# Patient Record
Sex: MALE | Race: WHITE | Employment: FULL TIME | URBAN - METROPOLITAN AREA
[De-identification: names, ages, dates, MRNs, and addresses within clinical notes are randomized per-mention and may not be internally consistent; named-entity substitution may affect disease eponyms.]

---

## 2017-03-03 ENCOUNTER — TELEPHONE (OUTPATIENT)
Dept: FAMILY MEDICINE | Facility: CLINIC | Age: 52
End: 2017-03-03

## 2017-03-03 NOTE — TELEPHONE ENCOUNTER
Reason for call:  Patient reporting a symptom    Symptom or request: Lump on breast    Duration (how long have symptoms been present): 2 weeks    Have you been treated for this before? Yes    Additional comments: Pt concerned about this & wants to establish care with Dr Salas. Please work him in next week . Preferrably next Mon or Tuesday, anytime.      Phone Number patient can be reached at:  Cell number on file:    Telephone Information:   Mobile [phone]       Best Time:  any    Can we leave a detailed message on this number:  YES    Call taken on 3/3/2017 at 5:04 PM by Carmel Mobley

## 2017-03-15 ENCOUNTER — TELEPHONE (OUTPATIENT)
Dept: FAMILY MEDICINE | Facility: CLINIC | Age: 52
End: 2017-03-15

## 2017-03-15 NOTE — TELEPHONE ENCOUNTER
Reason for Call:  Same Day Appointment, Requested Provider:  Elder King MD    PCP: Elder King    Reason for visit: patient has lump on breast, seems to be growing    Duration of symptoms: 3 weeks    Have you been treated for this in the past? No    Additional comments: called last week for appointment, states he never received call back with appointment info    Can we leave a detailed message on this number? NO    Phone number patient can be reached at: Home number on file [phone] (home)    Best Time: anytime    Call taken on 3/15/2017 at 2:35 PM by Gerri Morales

## 2017-03-20 ENCOUNTER — OFFICE VISIT (OUTPATIENT)
Dept: FAMILY MEDICINE | Facility: CLINIC | Age: 52
End: 2017-03-20
Payer: COMMERCIAL

## 2017-03-20 VITALS
WEIGHT: 253 LBS | HEART RATE: 60 BPM | SYSTOLIC BLOOD PRESSURE: 118 MMHG | HEIGHT: 75 IN | TEMPERATURE: 97.5 F | DIASTOLIC BLOOD PRESSURE: 80 MMHG | BODY MASS INDEX: 31.46 KG/M2 | OXYGEN SATURATION: 98 %

## 2017-03-20 DIAGNOSIS — Z12.5 SCREENING FOR PROSTATE CANCER: ICD-10-CM

## 2017-03-20 DIAGNOSIS — Z11.59 NEED FOR HEPATITIS C SCREENING TEST: ICD-10-CM

## 2017-03-20 DIAGNOSIS — Z13.6 CARDIOVASCULAR SCREENING; LDL GOAL LESS THAN 130: ICD-10-CM

## 2017-03-20 DIAGNOSIS — N52.9 ERECTILE DYSFUNCTION, UNSPECIFIED ERECTILE DYSFUNCTION TYPE: ICD-10-CM

## 2017-03-20 DIAGNOSIS — Z00.00 ENCOUNTER FOR ROUTINE ADULT HEALTH EXAMINATION WITHOUT ABNORMAL FINDINGS: ICD-10-CM

## 2017-03-20 DIAGNOSIS — Z12.11 SCREEN FOR COLON CANCER: ICD-10-CM

## 2017-03-20 DIAGNOSIS — N63.10 MASS OF RIGHT BREAST: Primary | ICD-10-CM

## 2017-03-20 LAB
ALBUMIN SERPL-MCNC: 3.7 G/DL (ref 3.4–5)
ALBUMIN UR-MCNC: NEGATIVE MG/DL
ALP SERPL-CCNC: 72 U/L (ref 40–150)
ALT SERPL W P-5'-P-CCNC: 26 U/L (ref 0–70)
ANION GAP SERPL CALCULATED.3IONS-SCNC: 8 MMOL/L (ref 3–14)
APPEARANCE UR: CLEAR
AST SERPL W P-5'-P-CCNC: 18 U/L (ref 0–45)
BILIRUB SERPL-MCNC: 0.7 MG/DL (ref 0.2–1.3)
BILIRUB UR QL STRIP: NEGATIVE
BUN SERPL-MCNC: 20 MG/DL (ref 7–30)
CALCIUM SERPL-MCNC: 9 MG/DL (ref 8.5–10.1)
CHLORIDE SERPL-SCNC: 105 MMOL/L (ref 94–109)
CHOLEST SERPL-MCNC: 181 MG/DL
CO2 SERPL-SCNC: 28 MMOL/L (ref 20–32)
COLOR UR AUTO: YELLOW
CREAT SERPL-MCNC: 0.96 MG/DL (ref 0.66–1.25)
CREAT UR-MCNC: 170 MG/DL
ERYTHROCYTE [DISTWIDTH] IN BLOOD BY AUTOMATED COUNT: 13.4 % (ref 10–15)
GFR SERPL CREATININE-BSD FRML MDRD: 82 ML/MIN/1.7M2
GLUCOSE SERPL-MCNC: 92 MG/DL (ref 70–99)
GLUCOSE UR STRIP-MCNC: NEGATIVE MG/DL
HCT VFR BLD AUTO: 43.2 % (ref 40–53)
HCV AB SERPL QL IA: NORMAL
HDLC SERPL-MCNC: 50 MG/DL
HGB BLD-MCNC: 14.6 G/DL (ref 13.3–17.7)
HGB UR QL STRIP: NEGATIVE
KETONES UR STRIP-MCNC: NEGATIVE MG/DL
LDLC SERPL CALC-MCNC: 103 MG/DL
LEUKOCYTE ESTERASE UR QL STRIP: NEGATIVE
MCH RBC QN AUTO: 30.5 PG (ref 26.5–33)
MCHC RBC AUTO-ENTMCNC: 33.8 G/DL (ref 31.5–36.5)
MCV RBC AUTO: 90 FL (ref 78–100)
MICROALBUMIN UR-MCNC: 6 MG/L
MICROALBUMIN/CREAT UR: 3.55 MG/G CR (ref 0–17)
NITRATE UR QL: NEGATIVE
NONHDLC SERPL-MCNC: 131 MG/DL
PH UR STRIP: 6 PH (ref 5–7)
PLATELET # BLD AUTO: 202 10E9/L (ref 150–450)
POTASSIUM SERPL-SCNC: 4.4 MMOL/L (ref 3.4–5.3)
PROT SERPL-MCNC: 7.1 G/DL (ref 6.8–8.8)
PSA SERPL-ACNC: 0.28 UG/L (ref 0–4)
RBC # BLD AUTO: 4.78 10E12/L (ref 4.4–5.9)
SODIUM SERPL-SCNC: 141 MMOL/L (ref 133–144)
SP GR UR STRIP: 1.02 (ref 1–1.03)
TRIGL SERPL-MCNC: 141 MG/DL
TSH SERPL DL<=0.005 MIU/L-ACNC: 3.78 MU/L (ref 0.4–4)
URN SPEC COLLECT METH UR: NORMAL
UROBILINOGEN UR STRIP-ACNC: 0.2 EU/DL (ref 0.2–1)
WBC # BLD AUTO: 5 10E9/L (ref 4–11)

## 2017-03-20 PROCEDURE — 80061 LIPID PANEL: CPT | Performed by: FAMILY MEDICINE

## 2017-03-20 PROCEDURE — 81003 URINALYSIS AUTO W/O SCOPE: CPT | Performed by: FAMILY MEDICINE

## 2017-03-20 PROCEDURE — 36415 COLL VENOUS BLD VENIPUNCTURE: CPT | Performed by: FAMILY MEDICINE

## 2017-03-20 PROCEDURE — G0103 PSA SCREENING: HCPCS | Performed by: FAMILY MEDICINE

## 2017-03-20 PROCEDURE — 84443 ASSAY THYROID STIM HORMONE: CPT | Performed by: FAMILY MEDICINE

## 2017-03-20 PROCEDURE — 85027 COMPLETE CBC AUTOMATED: CPT | Performed by: FAMILY MEDICINE

## 2017-03-20 PROCEDURE — 82043 UR ALBUMIN QUANTITATIVE: CPT | Performed by: FAMILY MEDICINE

## 2017-03-20 PROCEDURE — 99214 OFFICE O/P EST MOD 30 MIN: CPT | Performed by: FAMILY MEDICINE

## 2017-03-20 PROCEDURE — 86803 HEPATITIS C AB TEST: CPT | Performed by: FAMILY MEDICINE

## 2017-03-20 PROCEDURE — 80053 COMPREHEN METABOLIC PANEL: CPT | Performed by: FAMILY MEDICINE

## 2017-03-20 RX ORDER — SILDENAFIL CITRATE 20 MG/1
20 TABLET ORAL DAILY PRN
Qty: 30 TABLET | Refills: 1 | Status: SHIPPED | OUTPATIENT
Start: 2017-03-20 | End: 2017-09-08

## 2017-03-20 NOTE — PATIENT INSTRUCTIONS
Follow up for physical examination and to follow up on labs when able.       [facility]                        To reach your care team during and after hours:   [phone]  To reach our pharmacy:        [phone]    Clinic Hours                        Our clinic hours are:    Monday   7:30 am to 7:00 pm                  Tuesday through Friday 7:30 am to 5:00 pm                             Saturday   8:00 am to 12:00 pm      Sunday   Closed      Pharmacy Hours                        Our pharmacy hours are:    Monday   8:30 am to 7:00 pm       Tuesday to Friday  8:30 am to 6:00 pm                       Saturday    9:00 am to 1:00 pm              Sunday    Closed              There is also information available at our web site:  [email or web address]    If your provider ordered any lab tests and you do not receive the results within 10 business days, please call the clinic.    If you need a medication refill please contact your pharmacy.  Please allow 2-3 business days for your refill to be completed.    Our clinic offers telephone visits and e visits.  Please ask one of your team members to explain more.      Use [employer] (secure email communication and access to your chart) to send your primary care provider a message or make an appointment. Ask someone on your Team how to sign up for [email or web address].  Immunizations                      Immunization History   Administered Date(s) Administered     TD (ADULT, 7+) 10/18/1988, 06/19/2000     TDAP (ADACEL AGES 11-64) 05/29/2008        Health Maintenance                         Health Maintenance Due   Topic Date Due     Hepatitis C Screening  02/02/1983

## 2017-03-20 NOTE — LETTER
[address] [id number] [phone]   March 21, 2017    Herman Joseph  [address]  [facility] [address]      Dear Herman,    Here is a summary of your recent test results:      Labs are overall quite good.     We advise:     Continue current cares.     For additional lab test information, [email or web address] is an excellent reference.     Your test results are enclosed.      Please contact me if you have any questions.               Thank you very much for trusting [facility]..     Healthy regards,        Elder King M.D.        Results for orders placed or performed in visit on 03/20/17   Hepatitis C Screen Reflex to HCV RNA Quant and Genotype   Result Value Ref Range    Hepatitis C Antibody  NR     Nonreactive   Assay performance characteristics have not been established for newborns,   infants, and children     Comprehensive metabolic panel   Result Value Ref Range    Sodium 141 133 - 144 mmol/L    Potassium 4.4 3.4 - 5.3 mmol/L    Chloride 105 94 - 109 mmol/L    Carbon Dioxide 28 20 - 32 mmol/L    Anion Gap 8 3 - 14 mmol/L    Glucose 92 70 - 99 mg/dL    Urea Nitrogen 20 7 - 30 mg/dL    Creatinine 0.96 0.66 - 1.25 mg/dL    GFR Estimate 82 >60 mL/min/1.7m2    GFR Estimate If Black >90   GFR Calc   >60 mL/min/1.7m2    Calcium 9.0 8.5 - 10.1 mg/dL    Bilirubin Total 0.7 0.2 - 1.3 mg/dL    Albumin 3.7 3.4 - 5.0 g/dL    Protein Total 7.1 6.8 - 8.8 g/dL    Alkaline Phosphatase 72 40 - 150 U/L    ALT 26 0 - 70 U/L    AST 18 0 - 45 U/L   Lipid panel reflex to direct LDL   Result Value Ref Range    Cholesterol 181 <200 mg/dL    Triglycerides 141 <150 mg/dL    HDL Cholesterol 50 >39 mg/dL    LDL Cholesterol Calculated 103 (H) <100 mg/dL    Non HDL Cholesterol 131 (H) <130 mg/dL   CBC with platelets   Result Value Ref Range    WBC 5.0 4.0 - 11.0 10e9/L    RBC Count 4.78 4.4 - 5.9 10e12/L     Hemoglobin 14.6 13.3 - 17.7 g/dL    Hematocrit 43.2 40.0 - 53.0 %    MCV 90 78 - 100 fl    MCH 30.5 26.5 - 33.0 pg    MCHC 33.8 31.5 - 36.5 g/dL    RDW 13.4 10.0 - 15.0 %    Platelet Count 202 150 - 450 10e9/L   TSH with free T4 reflex   Result Value Ref Range    TSH 3.78 0.40 - 4.00 mU/L   Prostate spec antigen screen   Result Value Ref Range    PSA 0.28 0 - 4 ug/L   Albumin Random Urine Quantitative   Result Value Ref Range    Creatinine Urine 170 mg/dL    Albumin Urine mg/L 6 mg/L    Albumin Urine mg/g Cr 3.55 0 - 17 mg/g Cr   *UA reflex to Microscopic and Culture ([facility] (except [place] and [place])   Result Value Ref Range    Color Urine Yellow     Appearance Urine Clear     Glucose Urine Negative NEG mg/dL    Bilirubin Urine Negative NEG    Ketones Urine Negative NEG mg/dL    Specific Gravity Urine 1.025 1.003 - 1.035    Blood Urine Negative NEG    pH Urine 6.0 5.0 - 7.0 pH    Protein Albumin Urine Negative NEG mg/dL    Urobilinogen Urine 0.2 0.2 - 1.0 EU/dL    Nitrite Urine Negative NEG    Leukocyte Esterase Urine Negative NEG    Source Midstream Urine

## 2017-03-20 NOTE — NURSING NOTE
"Chief Complaint   Patient presents with     Breast Problem       Initial /80  Pulse 60  Temp 97.5  F (36.4  C) (Oral)  Ht 6' 3\" (1.905 m)  Wt 253 lb (114.8 kg)  SpO2 98%  BMI 31.62 kg/m2 Estimated body mass index is 31.62 kg/(m^2) as calculated from the following:    Height as of this encounter: 6' 3\" (1.905 m).    Weight as of this encounter: 253 lb (114.8 kg)..  BP completed using cuff size: dirk Cardona MA  "

## 2017-03-20 NOTE — PROGRESS NOTES
SUBJECTIVE:                                                    Herman Joseph is a 52 year old male who presents to clinic today for the following health issues:    Concern - breast lump - right     Onset: 3 weeks    Description:   Right side    Intensity: mild    Progression of Symptoms:  worsening    Accompanying Signs & Symptoms:  Remembers getting hit by car door about 6 months ago  Pain occasionally       Previous history of similar problem:   none    Precipitating factors:   Worsened by: na    Alleviating factors:  Improved by: na       Therapies Tried and outcome: nothing    Herman reported that he has been experiencing pain in his right breast around the nipple area. He reported that he hit the area hard on a car door 6 months ago, but did not experience any symptoms immediately following the trauma.     Denied discharge. Denied bruising.     ED - requesting viagra refill    Sinusitis -- Herman stated that he wakes up with significant sinus issues in the morning. He has tried Flonase with little relief.     Urethral Stricture -- Pt stated he was told that he should follow up with Urology at some time.     Heartburn -- Well controlled    Depression -- stable, no concerns at this time.     Lipids --  Recent Labs   Lab Test  08/20/13   0815   CHOL  168   HDL  38*   LDL  103   TRIG  135   CHOLHDLRATIO  4.4     Desires fasting labs    Problem list and histories reviewed & adjusted, as indicated.  Additional history: as documented    Reviewed and updated as needed this visit by clinical staff  Tobacco  Allergies  Meds  Med Hx  Surg Hx  Fam Hx  Soc Hx      Reviewed and updated as needed this visit by Provider         BP Readings from Last 3 Encounters:   03/20/17 118/80   04/29/16 138/78   02/11/16 130/81       Wt Readings from Last 4 Encounters:   03/20/17 253 lb (114.8 kg)   04/29/16 247 lb 6.4 oz (112.2 kg)   02/11/16 234 lb 4.8 oz (106.3 kg)   01/12/16 233 lb 9 oz (105.9 kg)       Health  Maintenance    Health Maintenance Due   Topic Date Due     HEPATITIS C SCREENING  02/02/1983       Current Problem List    Patient Active Problem List   Diagnosis     Kyphoscoliosis and scoliosis     Contact dermatitis and other eczema, due to unspecified cause     Healthcare maintenance     Urethral stricture     CARDIOVASCULAR SCREENING; LDL GOAL LESS THAN 130     Esophageal reflux     Chronic sinusitis     ED (erectile dysfunction)       Past Medical History    Past Medical History   Diagnosis Date     CARDIOVASCULAR SCREENING; LDL GOAL LESS THAN 130      Cellulitis      Chronic sinusitis      ED (erectile dysfunction)      Esophageal reflux 6/20/2002     Kyphoscoliosis and scoliosis      Major depressive disorder, single episode, moderate (H) 9/11/2004     URETHRAL STRICTURE NEC 9/11/2004       Past Surgical History    Past Surgical History   Procedure Laterality Date     Hernia repair  2011     Bilateral inguinal     Fulgurate condyloma rectum  8/21/2013     Procedure: FULGURATE CONDYLOMA RECTUM;  EXCISION AND FULGURATION OF ANAL CONDYLOMA;  Surgeon: Naseem Partida MD;  Location: [facility]     Exam under anesthesia, fulgurate condyloma anus, combined  12/18/2013     Procedure: COMBINED EXAM UNDER ANESTHESIA, FULGURATE CONDYLOMA ANUS;  EXAM UNDER ANESTHESIA, EXCISION/FULGURATION ANAL CONDYLOMA;  Surgeon: Naseem Partida MD;  Location: [facility]     Fulgurate condyloma rectum N/A 2/11/2016     Procedure: FULGURATE CONDYLOMA RECTUM;  Surgeon: Naseem Partida MD;  Location: [facility]     Colonoscopy N/A 2/11/2016     normal, due 10 yrs       Current Medications    Current Outpatient Prescriptions   Medication Sig Dispense Refill     sildenafil (REVATIO/VIAGRA) 20 MG tablet Take 1 tablet (20 mg) by mouth daily as needed 30 tablet 1     hydrocortisone (WESTCORT) 0.2 % cream Apply sparingly to affected area three times daily as needed. 60 g 2     fluticasone (FLONASE) 50 MCG/ACT nasal spray Spray 1-2 sprays into both nostrils  daily 3 Package 1     [DISCONTINUED] sildenafil (REVATIO/VIAGRA) 20 MG tablet Take 1 tablet (20 mg) by mouth daily as needed 30 tablet 0       Allergies    No Known Allergies    Immunizations    Immunization History   Administered Date(s) Administered     TD (ADULT, 7+) 10/18/1988, 06/19/2000     TDAP (ADACEL AGES 11-64) 05/29/2008       Family History    Family History   Problem Relation Age of Onset     CANCER Father      throat     Depression Sister      Anxiety Disorder Sister      CANCER Maternal Grandmother      ovarian     HEART DISEASE Maternal Grandmother      DIABETES Maternal Uncle      HEART DISEASE Paternal Grandfather      ??     Depression Sister      Anxiety Disorder Sister      Alcohol/Drug Brother      Depression Sister      Anxiety Disorder Sister      Depression Sister      Anxiety Disorder Sister      Cancer - colorectal No family hx of      Prostate Cancer No family hx of        Social History    Social History     Social History     Marital status:      Spouse name: Elen/Trudi     Number of children: 6     Years of education: 12     Occupational History           [employer]     Social History Main Topics     Smoking status: Never Smoker     Smokeless tobacco: Never Used     Alcohol use 1.8 - 2.4 oz/week     3 - 4 Standard drinks or equivalent per week      Comment: 6 per month avg     Drug use: No     Sexual activity: Yes     Partners: Female     Other Topics Concern     Exercise Yes     Seat Belt Yes     Self-Exams Yes     Parent/Sibling W/ Cabg, Mi Or Angioplasty Before 65f 55m? No     Social History Narrative       All above reviewed and updated, all stable unless otherwise noted    Recent labs reviewed    ROS:  Constitutional, HEENT, cardiovascular, pulmonary, GI, , musculoskeletal, neuro, skin, endocrine and psych systems are negative, except as in HPI or otherwise noted     This document serves as a record of the services and decisions personally performed and made  "by Elder King MD [facility]. It was created on their behalf by Claude David, a trained medical scribe. The creation of this document is based the provider's statements to the medical scribe.  Claude David March 20, 2017 8:22 AM      OBJECTIVE:                                                    /80  Pulse 60  Temp 97.5  F (36.4  C) (Oral)  Ht 6' 3\" (1.905 m)  Wt 253 lb (114.8 kg)  SpO2 98%  BMI 31.62 kg/m2  Body mass index is 31.62 kg/(m^2).  GENERAL: healthy, alert and no distress, obese   EYES: Eyes grossly normal to inspection, extraocular movements - intact, and PERRL  HENT: ear canals- normal; TMs- normal; Nose- normal; Mouth- no ulcers, no lesions  RESP: lungs clear to auscultation - no rales, no rhonchi, no wheezes  BREAST: no masses - residual breast tissue noted about the nipple 1 cm x 2 cm, mild tenderness to palpation at the nipple, no nipple discharge, no palpable axillary masses or adenopathy  CV: regular rates and rhythm, normal S1 S2, no S3 or S4 and no murmur, no click or rub - no carotid bruits  ABDOMEN: soft, no tenderness, no  hepatosplenomegaly, no masses, normal bowel sounds  SKIN: no suspicious lesions, no rashes to visible skin  NEURO: mentation- intact, speech- normal  BACK: no CVA tenderness, no paralumbar tenderness  PSYCH: Alert and oriented times 3; speech- coherent , normal rate and volume; able to articulate logical thoughts, able to abstract reason, no tangential thoughts, no hallucinations or delusions, affect- normal    DIAGNOSTICS/PROCEDURES:                                                      Results for orders placed or performed in visit on 03/20/17 (from the past 24 hour(s))   CBC with platelets   Result Value Ref Range    WBC 5.0 4.0 - 11.0 10e9/L    RBC Count 4.78 4.4 - 5.9 10e12/L    Hemoglobin 14.6 13.3 - 17.7 g/dL    Hematocrit 43.2 40.0 - 53.0 %    MCV 90 78 - 100 fl    MCH 30.5 26.5 - 33.0 pg    MCHC 33.8 31.5 - 36.5 g/dL    RDW 13.4 10.0 - 15.0 %    Platelet Count 202 150 " - 450 10e9/L   *UA reflex to Microscopic and Culture ([facility] (except [place] and [place])   Result Value Ref Range    Color Urine Yellow     Appearance Urine Clear     Glucose Urine Negative NEG mg/dL    Bilirubin Urine Negative NEG    Ketones Urine Negative NEG mg/dL    Specific Gravity Urine 1.025 1.003 - 1.035    Blood Urine Negative NEG    pH Urine 6.0 5.0 - 7.0 pH    Protein Albumin Urine Negative NEG mg/dL    Urobilinogen Urine 0.2 0.2 - 1.0 EU/dL    Nitrite Urine Negative NEG    Leukocyte Esterase Urine Negative NEG    Source Midstream Urine           ASSESSMENT/PLAN:                                                        ICD-10-CM    1. Mass of right breast N63 GENERAL SURG ADULT REFERRAL   2. CARDIOVASCULAR SCREENING; LDL GOAL LESS THAN 130 Z13.6 Lipid panel reflex to direct LDL   3. Erectile dysfunction, unspecified erectile dysfunction type N52.9 sildenafil (REVATIO/VIAGRA) 20 MG tablet   4. Encounter for routine adult health examination without abnormal findings Z00.00 Hepatitis C Screen Reflex to HCV RNA Quant and Genotype     Comprehensive metabolic panel     Lipid panel reflex to direct LDL     CBC with platelets     TSH with free T4 reflex     Prostate spec antigen screen     Albumin Random Urine Quantitative     *UA reflex to Microscopic and Culture ([facility] (except [place] and [place])     Fecal colorectal cancer screen (FIT)   5. Need for hepatitis C screening test Z11.59 Hepatitis C Screen Reflex to HCV RNA Quant and Genotype   6. Screening for prostate cancer Z12.5 Prostate spec antigen screen   7. Screen for colon cancer Z12.11 Fecal colorectal cancer screen (FIT)     Discussed treatment/modality options, including risk and benefits, he desires follow up for fasting complete physical, further diagnostic(s), further health care maintenance, further lab(s), medication refill(s), referrals (General Surgery) and observation. All  diagnosis above reviewed and noted above, otherwise stable.  See EpicCare orders for further details.  Follow up as needed, cpx fasting soon.    Health Maintenance Due   Topic Date Due     HEPATITIS C SCREENING  02/02/1983       See Patient Instructions    The information in this document, created by the medical scribe for me, accurately reflects the services I personally performed and the decisions made by me. I have reviewed and approved this document for accuracy.   Elder King MD [facility]            Elder iKng MD [address]  [address] [phone] Fax

## 2017-03-20 NOTE — MR AVS SNAPSHOT
After Visit Summary   3/20/2017    Herman Joseph    MRN: [id number]           Patient Information     Date Of Birth          1965        Visit Information        Provider Department      3/20/2017 8:00 AM Elder King MD [facility]        Today's Diagnoses     Mass of right breast    -  1    Need for hepatitis C screening test        CARDIOVASCULAR SCREENING; LDL GOAL LESS THAN 130        Encounter for routine adult health examination without abnormal findings        Screening for prostate cancer        Screen for colon cancer          Care Instructions    Follow up for physical examination and to follow up on labs when able.       [facility]                        To reach your care team during and after hours:   [phone]  To reach our pharmacy:        [phone]    Clinic Hours                        Our clinic hours are:    Monday   7:30 am to 7:00 pm                  Tuesday through Friday 7:30 am to 5:00 pm                             Saturday   8:00 am to 12:00 pm      Sunday   Closed      Pharmacy Hours                        Our pharmacy hours are:    Monday   8:30 am to 7:00 pm       Tuesday to Friday  8:30 am to 6:00 pm                       Saturday    9:00 am to 1:00 pm              Sunday    Closed              There is also information available at our web site:  [email or web address]    If your provider ordered any lab tests and you do not receive the results within 10 business days, please call the clinic.    If you need a medication refill please contact your pharmacy.  Please allow 2-3 business days for your refill to be completed.    Our clinic offers telephone visits and e visits.  Please ask one of your team members to explain more.      Use [employer] (secure email communication and access to your chart) to send your primary care provider a message or make an appointment. Ask someone on your Team how to sign up for [employer] SLt.  Immunizations                       Immunization History   Administered Date(s) Administered     TD (ADULT, 7+) 10/18/1988, 06/19/2000     TDAP (ADACEL AGES 11-64) 05/29/2008        Health Maintenance                         Health Maintenance Due   Topic Date Due     Hepatitis C Screening  02/02/1983             Follow-ups after your visit        Additional Services     GENERAL SURG ADULT REFERRAL       Your provider has referred you to: [place]: [facility] [place] [phone]   [email or web address]    Please be aware that coverage of these services is subject to the terms and limitations of your health insurance plan.  Call member services at your health plan with any benefit or coverage questions.      Please bring the following with you to your appointment:    (1) Any X-Rays, CTs or MRIs which have been performed.  Contact the facility where they were done to arrange for  prior to your scheduled appointment.  Any new CT, MRI or other procedures ordered by your specialist must be performed at a [facility] facility or coordinated by your clinic's referral office.    (2) List of current medications   (3) This referral request   (4) Any documents/labs given to you for this referral                  Future tests that were ordered for you today     Open Future Orders        Priority Expected Expires Ordered    Fecal colorectal cancer screen (FIT) Routine 4/10/2017 6/12/2017 3/20/2017            Who to contact     If you have questions or need follow up information about today's clinic visit or your schedule please contact [facility] directly at [phone].  Normal or non-critical lab and imaging results will be communicated to you by MyChart, letter or phone within 4 business days after the clinic has received the results. If you do not hear from us within 7 days, please contact the clinic through MyChart or phone. If you have a critical or abnormal lab  "result, we will notify you by phone as soon as possible.  Submit refill requests through [employer] or call your pharmacy and they will forward the refill request to us. Please allow 3 business days for your refill to be completed.          Additional Information About Your Visit        [employer] Information     [employer] gives you secure access to your electronic health record. If you see a primary care provider, you can also send messages to your care team and make appointments. If you have questions, please call your primary care clinic.  If you do not have a primary care provider, please call [phone] and they will assist you.        Care EveryWhere ID     This is your Care EveryWhere ID. This could be used by other organizations to access your [place] medical records  [id number]        Your Vitals Were     Pulse Temperature Height Pulse Oximetry BMI (Body Mass Index)       60 97.5  F (36.4  C) (Oral) 6' 3\" (1.905 m) 98% 31.62 kg/m2        Blood Pressure from Last 3 Encounters:   03/20/17 118/80   04/29/16 138/78   02/11/16 130/81    Weight from Last 3 Encounters:   03/20/17 253 lb (114.8 kg)   04/29/16 247 lb 6.4 oz (112.2 kg)   02/11/16 234 lb 4.8 oz (106.3 kg)              We Performed the Following     *UA reflex to Microscopic and Culture ([facility] (except [place] and [place])     Albumin Random Urine Quantitative     CBC with platelets     Comprehensive metabolic panel     GENERAL SURG ADULT REFERRAL     Hepatitis C Screen Reflex to HCV RNA Quant and Genotype     Lipid panel reflex to direct LDL     Prostate spec antigen screen     TSH with free T4 reflex        Primary Care Provider Office Phone # Fax #    Elder King [phone]       [address] [address]        Thank you!     Thank you for choosing [facility]  for your care. Our goal is always to provide you with excellent care. Hearing " back from our patients is one way we can continue to improve our services. Please take a few minutes to complete the written survey that you may receive in the mail after your visit with us. Thank you!             Your Updated Medication List - Protect others around you: Learn how to safely use, store and throw away your medicines at www.disposemymeds.org.          This list is accurate as of: 3/20/17  8:38 AM.  Always use your most recent med list.                   Brand Name Dispense Instructions for use    fluticasone 50 MCG/ACT spray    FLONASE    3 Package    Spray 1-2 sprays into both nostrils daily       hydrocortisone 0.2 % cream    WESTCORT    60 g    Apply sparingly to affected area three times daily as needed.       sildenafil 20 MG tablet    REVATIO/VIAGRA    30 tablet    Take 1 tablet (20 mg) by mouth daily as needed

## 2017-03-20 NOTE — LETTER
[facility]  [address] [place] [id number]                  [phone]   March 21, 2017    Herman Joseph  [address]  [facility] [address]      Dear Herman,    Here is a summary of your recent test results:    Labs are overall quite good.     We advise:     Continue current cares.     For additional lab test information, [email or web address] is an excellent reference.     Your test results are enclosed.      Please contact me if you have any questions.    In addition, here is a list of due or overdue Health Maintenance reminders.    There are no preventive care reminders to display for this patient.    Please call us at [phone] (or use [employer]) to address the above recommendations.            Thank you very much for trusting [facility]..     Healthy regards,        Elder King M.D.        Results for orders placed or performed in visit on 03/20/17   Hepatitis C Screen Reflex to HCV RNA Quant and Genotype   Result Value Ref Range    Hepatitis C Antibody  NR     Nonreactive   Assay performance characteristics have not been established for newborns,   infants, and children     Comprehensive metabolic panel   Result Value Ref Range    Sodium 141 133 - 144 mmol/L    Potassium 4.4 3.4 - 5.3 mmol/L    Chloride 105 94 - 109 mmol/L    Carbon Dioxide 28 20 - 32 mmol/L    Anion Gap 8 3 - 14 mmol/L    Glucose 92 70 - 99 mg/dL    Urea Nitrogen 20 7 - 30 mg/dL    Creatinine 0.96 0.66 - 1.25 mg/dL    GFR Estimate 82 >60 mL/min/1.7m2    GFR Estimate If Black >90   GFR Calc   >60 mL/min/1.7m2    Calcium 9.0 8.5 - 10.1 mg/dL    Bilirubin Total 0.7 0.2 - 1.3 mg/dL    Albumin 3.7 3.4 - 5.0 g/dL    Protein Total 7.1 6.8 - 8.8 g/dL    Alkaline Phosphatase 72 40 - 150 U/L    ALT 26 0 - 70 U/L    AST 18 0 - 45 U/L   Lipid panel reflex to direct LDL   Result Value Ref Range    Cholesterol 181 <200 mg/dL    Triglycerides 141 <150 mg/dL    HDL  Cholesterol 50 >39 mg/dL    LDL Cholesterol Calculated 103 (H) <100 mg/dL    Non HDL Cholesterol 131 (H) <130 mg/dL   CBC with platelets   Result Value Ref Range    WBC 5.0 4.0 - 11.0 10e9/L    RBC Count 4.78 4.4 - 5.9 10e12/L    Hemoglobin 14.6 13.3 - 17.7 g/dL    Hematocrit 43.2 40.0 - 53.0 %    MCV 90 78 - 100 fl    MCH 30.5 26.5 - 33.0 pg    MCHC 33.8 31.5 - 36.5 g/dL    RDW 13.4 10.0 - 15.0 %    Platelet Count 202 150 - 450 10e9/L   TSH with free T4 reflex   Result Value Ref Range    TSH 3.78 0.40 - 4.00 mU/L   Prostate spec antigen screen   Result Value Ref Range    PSA 0.28 0 - 4 ug/L   Albumin Random Urine Quantitative   Result Value Ref Range    Creatinine Urine 170 mg/dL    Albumin Urine mg/L 6 mg/L    Albumin Urine mg/g Cr 3.55 0 - 17 mg/g Cr   *UA reflex to Microscopic and Culture ([facility] (except [place] and [place])   Result Value Ref Range    Color Urine Yellow     Appearance Urine Clear     Glucose Urine Negative NEG mg/dL    Bilirubin Urine Negative NEG    Ketones Urine Negative NEG mg/dL    Specific Gravity Urine 1.025 1.003 - 1.035    Blood Urine Negative NEG    pH Urine 6.0 5.0 - 7.0 pH    Protein Albumin Urine Negative NEG mg/dL    Urobilinogen Urine 0.2 0.2 - 1.0 EU/dL    Nitrite Urine Negative NEG    Leukocyte Esterase Urine Negative NEG    Source Midstream Urine

## 2017-03-23 ENCOUNTER — OFFICE VISIT (OUTPATIENT)
Dept: SURGERY | Facility: CLINIC | Age: 52
End: 2017-03-23
Payer: COMMERCIAL

## 2017-03-23 VITALS
WEIGHT: 253 LBS | DIASTOLIC BLOOD PRESSURE: 68 MMHG | SYSTOLIC BLOOD PRESSURE: 124 MMHG | HEART RATE: 72 BPM | BODY MASS INDEX: 30.81 KG/M2 | OXYGEN SATURATION: 98 % | HEIGHT: 76 IN

## 2017-03-23 DIAGNOSIS — N63.10 BREAST MASS, RIGHT: Primary | ICD-10-CM

## 2017-03-23 PROCEDURE — 99203 OFFICE O/P NEW LOW 30 MIN: CPT | Performed by: SURGERY

## 2017-03-23 NOTE — MR AVS SNAPSHOT
"              After Visit Summary   3/23/2017    Herman Joseph    MRN: [id number]           Patient Information     Date Of Birth          1965        Visit Information        Provider Department      3/23/2017 4:00 PM Alok Serrano MD [facility] General Surgery      Today's Diagnoses     Breast mass, right    -  1       Follow-ups after your visit        Who to contact     If you have questions or need follow up information about today's clinic visit or your schedule please contact [facility] directly at [phone].  Normal or non-critical lab and imaging results will be communicated to you by [employer], letter or phone within 4 business days after the clinic has received the results. If you do not hear from us within 7 days, please contact the clinic through [employer] or phone. If you have a critical or abnormal lab result, we will notify you by phone as soon as possible.  Submit refill requests through TapMe or call your pharmacy and they will forward the refill request to us. Please allow 3 business days for your refill to be completed.          Additional Information About Your Visit        MyChart Information     [employer] gives you secure access to your electronic health record. If you see a primary care provider, you can also send messages to your care team and make appointments. If you have questions, please call your primary care clinic.  If you do not have a primary care provider, please call [phone] and they will assist you.        Care EveryWhere ID     This is your Care EveryWhere ID. This could be used by other organizations to access your [place] medical records  [id number]        Your Vitals Were     Pulse Height Pulse Oximetry BMI (Body Mass Index)          72 6' 3.75\" (1.924 m) 98% 31 kg/m2         Blood Pressure from Last 3 Encounters:   03/23/17 124/68   03/20/17 118/80   04/29/16 138/78    Weight from Last 3 " Encounters:   03/23/17 253 lb (114.8 kg)   03/20/17 253 lb (114.8 kg)   04/29/16 247 lb 6.4 oz (112.2 kg)              Today, you had the following     No orders found for display       Primary Care Provider Office Phone # Fax #    Elder King [phone]       [address] [address]        Thank you!     Thank you for choosing [facility]  for your care. Our goal is always to provide you with excellent care. Hearing back from our patients is one way we can continue to improve our services. Please take a few minutes to complete the written survey that you may receive in the mail after your visit with us. Thank you!             Your Updated Medication List - Protect others around you: Learn how to safely use, store and throw away your medicines at www.disposemymeds.org.          This list is accurate as of: 3/23/17  4:35 PM.  Always use your most recent med list.                   Brand Name Dispense Instructions for use    fluticasone 50 MCG/ACT spray    FLONASE    3 Package    Spray 1-2 sprays into both nostrils daily       hydrocortisone 0.2 % cream    WESTCORT    60 g    Apply sparingly to affected area three times daily as needed.       sildenafil 20 MG tablet    REVATIO/VIAGRA    30 tablet    Take 1 tablet (20 mg) by mouth daily as needed

## 2017-03-23 NOTE — PROGRESS NOTES
HPI:  right breast mass noted 3 weeks ago; areola.    denies increase in size.   Skin rashes, dimpling or nipple changes:  none  Nipple discharge:   none  Does not perform self breast exams.  Previous breast biopsies: No  Previous cyst aspiration: No  Previous other breast surgery: No    Family History   Problem Relation Age of Onset     CANCER Father      throat     Depression Sister      Anxiety Disorder Sister      CANCER Maternal Grandmother      ovarian     HEART DISEASE Maternal Grandmother      DIABETES Maternal Uncle      HEART DISEASE Paternal Grandfather      ??     Depression Sister      Anxiety Disorder Sister      Alcohol/Drug Brother      Depression Sister      Anxiety Disorder Sister      Depression Sister      Anxiety Disorder Sister      Cancer - colorectal No family hx of      Prostate Cancer No family hx of      Family history of ovarian cancer: Yes, mother  Family history of colon cancer: No  Family history of pancreatic cancer: No  Family history of prostate cancer: No    Imaging: None recent, CT scan for trauma 1/30/12 showed possible left breast nodule measuring 11 mm, on the right there is minimal breast tissue noted    Past Medical History:   has a past medical history of CARDIOVASCULAR SCREENING; LDL GOAL LESS THAN 130; Cellulitis; Chronic sinusitis; ED (erectile dysfunction); Esophageal reflux (6/20/2002); Kyphoscoliosis and scoliosis; Major depressive disorder, single episode, moderate (H) (9/11/2004); and URETHRAL STRICTURE NEC (9/11/2004).    Past Surgical History:  Past Surgical History:   Procedure Laterality Date     COLONOSCOPY N/A 2/11/2016    normal, due 10 yrs     EXAM UNDER ANESTHESIA, FULGURATE CONDYLOMA ANUS, COMBINED  12/18/2013    Procedure: COMBINED EXAM UNDER ANESTHESIA, FULGURATE CONDYLOMA ANUS;  EXAM UNDER ANESTHESIA, EXCISION/FULGURATION ANAL CONDYLOMA;  Surgeon: Naseem Partida MD;  Location: [facility]     FULGURATE CONDYLOMA RECTUM  8/21/2013    Procedure: FULGURATE  CONDYLOMA RECTUM;  EXCISION AND FULGURATION OF ANAL CONDYLOMA;  Surgeon: Naseem Partida MD;  Location: [facility]     FULGURATE CONDYLOMA RECTUM N/A 2/11/2016    Procedure: FULGURATE CONDYLOMA RECTUM;  Surgeon: Naseem Partida MD;  Location: [facility]     HERNIA REPAIR  2011    Bilateral inguinal        Social History:  Social History     Social History     Marital status:      Spouse name: Elen/Trudi     Number of children: 6     Years of education: 12     Occupational History           [employer]     Social History Main Topics     Smoking status: Never Smoker     Smokeless tobacco: Never Used     Alcohol use 1.8 - 2.4 oz/week     3 - 4 Standard drinks or equivalent per week      Comment: 6 per month avg     Drug use: No     Sexual activity: Yes     Partners: Female     Other Topics Concern     Exercise Yes     Seat Belt Yes     Self-Exams Yes     Parent/Sibling W/ Cabg, Mi Or Angioplasty Before 65f 55m? No     Social History Narrative       PE:  Vitals: There were no vitals taken for this visit.  General appearance: well-nourished, sitting comfortably, no apparent distress  Neck: Supple without thyromegaly, lymphadenopathy, masses, palpation of the thyroid reveals no masses  Lungs: Respirations unlabored  Abdomen: soft, nondistended  Genitalia: Declines exam, has noted no testicular masses  Extremities: Without edema  Neurologic: nonfocal, grossly intact times four extremities, alert and oriented times three  Psychiatric: Mood and affect are appropriate  Skin: Without lesions or rashes  Breast:     Masses- right - 2 cm diameter, centered over the upper outer areola, no discharge, no erythema, tender   Ecchymosis- none   Incisional scar- none    Axillae:   Palpable adenopathy: none    Assessment: Right breast mass and tenderness, clinically consistent with gynecomastia    PLAN: We'll obtain a mammogram for evaluation and then proceed with core biopsy or observation or surgical excision depending on the  patient's preference.  I did discuss surgical excision including incision, scar, cosmetic changes, numbness and recurrence.  Since he did have a mass noted on his CT in 2012, we will also image the left side.  We discussed the potential for continued observation and avoiding surgery as gynecomastia frequently will somewhat resolve on its own especially as relates to tenderness.      Alok Serrano MD    Please route or send letter to:  Primary Care Provider (PCP) and Include Progress Note

## 2017-03-23 NOTE — LETTER
2017    RE:  Herman Joseph-:  65    HPI:  right breast mass noted 3 weeks ago; areola.   denies increase in size.   Skin rashes, dimpling or nipple changes: none  Nipple discharge: none  Does not perform self breast exams.  Previous breast biopsies: No  Previous cyst aspiration: No  Previous other breast surgery: No    Family history of ovarian cancer: Yes, mother  Family history of colon cancer: No  Family history of pancreatic cancer: No  Family history of prostate cancer: No     Imaging: None recent, CT scan for trauma 12 showed possible left breast nodule measuring 11 mm, on the right there is minimal breast tissue noted     Past Medical History:   has a past medical history of CARDIOVASCULAR SCREENING; LDL GOAL LESS THAN 130; Cellulitis; Chronic sinusitis; ED (erectile dysfunction); Esophageal reflux (2002); Kyphoscoliosis and scoliosis; Major depressive disorder, single episode, moderate (H) (2004); and URETHRAL STRICTURE NEC (2004).     PE:  Vitals: There were no vitals taken for this visit.  General appearance: well-nourished, sitting comfortably, no apparent distress  Neck: Supple without thyromegaly, lymphadenopathy, masses, palpation of the thyroid reveals no masses  Lungs: Respirations unlabored  Abdomen: soft, nondistended  Genitalia: Declines exam, has noted no testicular masses  Extremities: Without edema  Neurologic: nonfocal, grossly intact times four extremities, alert and oriented times three  Psychiatric: Mood and affect are appropriate  Skin: Without lesions or rashes  Breast:   Masses- right - 2 cm diameter, centered over the upper outer areola, no discharge, no erythema, tender  Ecchymosis- none  Incisional scar- none     Axillae: Palpable adenopathy: none     Assessment: Right breast mass and tenderness, clinically consistent with gynecomastia     PLAN: We'll obtain a mammogram for evaluation and then proceed with core biopsy or observation or surgical  excision depending on the patient's preference. I did discuss surgical excision including incision, scar, cosmetic changes, numbness and recurrence. Since he did have a mass noted on his CT in 2012, we will also image the left side. We discussed the potential for continued observation and avoiding surgery as gynecomastia frequently will somewhat resolve on its own especially as relates to tenderness.        Alok Serrano MD

## 2017-03-24 ENCOUNTER — TELEPHONE (OUTPATIENT)
Dept: FAMILY MEDICINE | Facility: CLINIC | Age: 52
End: 2017-03-24

## 2017-03-24 DIAGNOSIS — N63.10 BREAST MASS, RIGHT: Primary | ICD-10-CM

## 2017-03-24 DIAGNOSIS — N63.20 LEFT BREAST MASS: ICD-10-CM

## 2017-03-24 NOTE — TELEPHONE ENCOUNTER
Pt calling about recent results.    The patient indicates understanding of these issues and agrees with the plan.  Margy Garza RN

## 2017-05-04 ENCOUNTER — HOSPITAL ENCOUNTER (OUTPATIENT)
Dept: MAMMOGRAPHY | Facility: CLINIC | Age: 52
Discharge: HOME | End: 2017-05-04
Attending: SURGERY | Admitting: SURGERY
Payer: COMMERCIAL

## 2017-05-04 ENCOUNTER — HOSPITAL ENCOUNTER (OUTPATIENT)
Dept: ULTRASOUND IMAGING | Facility: CLINIC | Age: 52
End: 2017-05-04
Attending: SURGERY
Payer: COMMERCIAL

## 2017-05-04 DIAGNOSIS — N63.20 LEFT BREAST MASS: ICD-10-CM

## 2017-05-04 DIAGNOSIS — N63.10 BREAST MASS, RIGHT: ICD-10-CM

## 2017-05-04 PROCEDURE — G0204 DX MAMMO INCL CAD BI: HCPCS

## 2017-05-04 PROCEDURE — 76642 ULTRASOUND BREAST LIMITED: CPT | Mod: RT

## 2017-09-08 DIAGNOSIS — N52.9 ERECTILE DYSFUNCTION, UNSPECIFIED ERECTILE DYSFUNCTION TYPE: ICD-10-CM

## 2017-09-11 RX ORDER — SILDENAFIL CITRATE 20 MG/1
20 TABLET ORAL DAILY PRN
Qty: 30 TABLET | Refills: 1 | Status: SHIPPED | OUTPATIENT
Start: 2017-09-11 | End: 2018-04-23

## 2017-09-11 RX ORDER — SILDENAFIL CITRATE 20 MG/1
TABLET ORAL
Qty: 30 TABLET | Refills: 0 | Status: SHIPPED | OUTPATIENT
Start: 2017-09-11 | End: 2020-01-21

## 2017-09-11 NOTE — TELEPHONE ENCOUNTER
Viagra      Last Written Prescription Date: 03/20/2017  Last Fill Quantity: 30,  # refills: 1   Last Office Visit with G, [facility] or [facility] prescribing provider: 03/20/2017

## 2017-09-11 NOTE — TELEPHONE ENCOUNTER
Viagra      Last Written Prescription Date: 3/20/2017  Last Fill Quantity: 30,  # refills: 1   Last Office Visit with G, P or [facility] prescribing provider: 3/20/2017      Medication is being filled for 1 time refill only due to:  Due for physical. Please call to schedule.     JUAN JOSE Lovelace, RN, PHN  [facility]) [phone]

## 2018-02-12 ENCOUNTER — OFFICE VISIT (OUTPATIENT)
Dept: FAMILY MEDICINE | Facility: CLINIC | Age: 53
End: 2018-02-12
Payer: COMMERCIAL

## 2018-02-12 VITALS
SYSTOLIC BLOOD PRESSURE: 130 MMHG | WEIGHT: 254 LBS | HEART RATE: 68 BPM | OXYGEN SATURATION: 99 % | TEMPERATURE: 97.2 F | DIASTOLIC BLOOD PRESSURE: 82 MMHG | HEIGHT: 76 IN | BODY MASS INDEX: 30.93 KG/M2

## 2018-02-12 DIAGNOSIS — J03.00 ACUTE NON-RECURRENT STREPTOCOCCAL TONSILLITIS: Primary | ICD-10-CM

## 2018-02-12 DIAGNOSIS — R07.0 THROAT PAIN: ICD-10-CM

## 2018-02-12 LAB
DEPRECATED S PYO AG THROAT QL EIA: ABNORMAL
SPECIMEN SOURCE: ABNORMAL

## 2018-02-12 PROCEDURE — 99213 OFFICE O/P EST LOW 20 MIN: CPT | Performed by: FAMILY MEDICINE

## 2018-02-12 PROCEDURE — 87880 STREP A ASSAY W/OPTIC: CPT | Performed by: FAMILY MEDICINE

## 2018-02-12 RX ORDER — AMOXICILLIN 500 MG/1
500 CAPSULE ORAL 2 TIMES DAILY
Qty: 20 CAPSULE | Refills: 0 | Status: SHIPPED | OUTPATIENT
Start: 2018-02-12 | End: 2018-02-22

## 2018-02-12 RX ORDER — AMOXICILLIN 500 MG/1
1000 CAPSULE ORAL 2 TIMES DAILY
Qty: 28 CAPSULE | Refills: 0 | Status: SHIPPED | OUTPATIENT
Start: 2018-02-12 | End: 2018-02-12

## 2018-02-12 NOTE — NURSING NOTE
"Chief Complaint   Patient presents with     Pharyngitis       Initial /82  Pulse 68  Temp 97.2  F (36.2  C) (Tympanic)  Ht 6' 3.75\" (1.924 m)  Wt 254 lb (115.2 kg)  SpO2 99%  BMI 31.12 kg/m2 Estimated body mass index is 31.12 kg/(m^2) as calculated from the following:    Height as of this encounter: 6' 3.75\" (1.924 m).    Weight as of this encounter: 254 lb (115.2 kg).  Medication Reconciliation: complete   Francheska Kruger Student MA      "

## 2018-02-12 NOTE — PROGRESS NOTES
SUBJECTIVE:                                                    Herman Joseph is a 53 year old male who presents to clinic today for the following health issues:    Acute Illness   Acute illness concerns: Sore Throat  Onset: x5days ago on and off, stuffy nose 4 days prior to that    Fever: no    Chills/Sweats: YES- chills    Headache (location?): YES- center and above sinuses    Sinus Pressure:YES    Conjunctivitis:  no    Ear Pain: no    Rhinorrhea: YES    Congestion: YES- slight    Sore Throat: YES- pt states feels swollen     Cough: YES - dry    Wheeze: no    Decreased Appetite: no    Nausea: no    Vomiting: no    Diarrhea:  no    Dysuria/Freq.: no    Fatigue/Achiness: YES- both    Sick/Strep Exposure: YES- gf recently sick with same symptoms     Therapies Tried and outcome: ibuprofen, advil sudafed      Problem list and histories reviewed & adjusted, as indicated.  Additional history: as documented    Reviewed and updated as needed this visit by clinical staff       Reviewed and updated as needed this visit by Provider        Patient Active Problem List   Diagnosis     Kyphoscoliosis and scoliosis     Contact dermatitis and other eczema, due to unspecified cause     Healthcare maintenance     Urethral stricture     CARDIOVASCULAR SCREENING; LDL GOAL LESS THAN 130     Esophageal reflux     Chronic sinusitis     ED (erectile dysfunction)       Current Outpatient Prescriptions   Medication Sig Dispense Refill     sildenafil (REVATIO/VIAGRA) 20 MG tablet TAKE ONE TABLET BY MOUTH EVERY DAY AS NEEDED 30 tablet 0     sildenafil (REVATIO/VIAGRA) 20 MG tablet Take 1 tablet (20 mg) by mouth daily as needed 30 tablet 1     hydrocortisone (WESTCORT) 0.2 % cream Apply sparingly to affected area three times daily as needed. 60 g 2     fluticasone (FLONASE) 50 MCG/ACT nasal spray Spray 1-2 sprays into both nostrils daily 3 Package 1        No Known Allergies    ROS:   ROS: 12 point ROS neg other than the symptoms noted  "above    OBJECTIVE:                                                    /82  Pulse 68  Temp 97.2  F (36.2  C) (Tympanic)  Ht 6' 3.75\" (1.924 m)  Wt 254 lb (115.2 kg)  SpO2 99%  BMI 31.12 kg/m2  Body mass index is 31.12 kg/(m^2).   GENERAL: healthy, alert, well nourished, well hydrated, no distress  HENT: ear canals- normal; TMs- normal; Nose- normal; Mouth- no ulcers, no lesions, mildly red posterior pharynx with some red petechiae on soft palate.   NECK: no tenderness, no adenopathy, no asymmetry, no masses, no stiffness; thyroid- normal to palpation  RESP: lungs clear to auscultation - no rales, no rhonchi, no wheezes  CV: regular rates and rhythm, normal S1 S2, no S3 or S4 and no murmur, no click or rub -  ABDOMEN: soft, no tenderness, no  hepatosplenomegaly, no masses, normal bowel sounds  MS: extremities- no gross deformities noted, no edema.     Diagnostic test results:  Results for orders placed or performed in visit on 02/12/18 (from the past 24 hour(s))   Strep, Rapid Screen   Result Value Ref Range    Specimen Description Throat     Rapid Strep A Screen (A)      POSITIVE: Group A Streptococcal antigen detected by immunoassay.        ASSESSMENT/PLAN:                                                        ICD-10-CM    1. Acute non-recurrent streptococcal tonsillitis J03.00 Strep, Rapid Screen     amoxicillin (AMOXIL) 500 MG capsule     DISCONTINUED: amoxicillin (AMOXIL) 500 MG capsule   2. Throat pain R07.0 Strep, Rapid Screen     Per Up to Date- \"Amoxicillin  500 mg orally twice daily for 10 days\"   See Patient Instructions. .Please, call or return to clinic or go to the ER immediately if signs or symptoms worsen or fail to improve as anticipated.    Pt will get flu shot in 5-7 days.          Mra Burns MD    [facility]    "

## 2018-02-12 NOTE — MR AVS SNAPSHOT
After Visit Summary   2/12/2018    Herman Joseph    MRN: [id number]           Patient Information     Date Of Birth          1965        Visit Information        Provider Department      2/12/2018 3:45 PM Mar Burns MD [facility] [place]        Today's Diagnoses     Acute non-recurrent streptococcal tonsillitis    -  1    Throat pain          Care Instructions                Pharyngitis Strep (Strep Throat) Adult   Information About Your Condition:  Description  Sore throat is a common symptom that ranges in severity from just a sense of scratchiness to severe pain. Pharyngitis is the medical term for sore throat.   Strep throat is an inflamed (red and swollen) throat caused by infection with a kind of bacteria called group A Streptococci. With treatment, the fever and sore throat pain are usually gone within 24 hours. After taking antibiotics for 24 hours you are no longer contagious. It is important to treat strep throat to prevent some rare but serious complications such as rheumatic fever (a disease that affects the heart) or glomerulonephritis (a disease that affects the kidneys).   Symptoms   The symptoms of a strep infection may include one or more of the following:   sore, red throat   painful swallowing   fever   chills   headaches   muscle aches and pains   tired feeling   swollen, tender lymph nodes (glands) in the neck   loss of appetite   Causes  Strep throat is caused by infection with bacteria called Group A Streptococci. Strep infections are very contagious. They are usually passed directly from person to person. Strep throat is most common in school-age children, who then often pass it to the rest of the family. It most often occurs from November through April, but it can happen any time of year.   What You Should Do At Home (Follow-up Care)   It is important to get plenty of rest when you are not feeling well so that your body may focus its energy on  getting you healthy.   If you smoke, stop. If someone else in your household smokes ask them to smoke outside.   If you were given a prescription for antibiotics, be sure to get it filled right away. Follow the directions exactly. Take the medicine until it is completely gone. Do not stop taking it just because you feel better.   Acetaminophen (Tylenol ) or over-the-counter anti-inflammatory medicine such as ibuprofen (Motrin , Advil ) or naproxen (Aleve , Naprosyn ) may help decrease fever and pain. You should not take ibuprofen or naproxen if you have a history of bleeding in your stomach.   As long as your healthcare provider has not told you differently, drink plenty of liquids. An average adult should drink at least 6 to 10 eight-ounce glasses of liquids that do not contain alcohol (including beer or wine), such as water, juice, or weak tea each day. One way to tell if you are drinking enough liquid is to look at the color of your urine (pee). It should be very light yellow.   If eating hurts your throat, don't force yourself to eat solid food. When you are able to eat more foods, choose healthy food to give you strength and to help fight the infection.   If the air in your home is dry, a cool-mist humidifier can moisten the air and help make breathing easier. Be sure to clean your humidifier often so that bacteria and mold can t grow. You can also try running hot water in the shower or bathtub to steam up the bathroom. Sit in there for 10 to 15 minutes if you are coughing hard or having trouble breathing.   Gargle with salt water. Stir 1/2 teaspoon salt in an 8-ounce glass of warm water to make your own salt water gargle.   Suck on lozenges or hard candy.   Don't talk a lot. Rest your voice.   Avoid close contact with other people until you have been taking the antibiotic for 24 to 48 hours so they will not be exposed to the strep bacteria.   Cover your nose and mouth with a tissue when coughing or sneezing  and then throw it away in the nearest waste receptacle.   Wash your hands often with warm water and soap for at least 15 seconds before you touch food, dishes, glasses, silverware, or cloth napkins. You can also carry an alcohol-based hand  with you to clean your hands when soap and water aren t available.   Wash your hands after you cough.   Please keep all medicines out of the reach of children.   What You Can Do To Stay Healthy  Be careful not to let your nose or mouth touch public telephones or drinking fountains.   Use paper cups and paper towels in bathrooms instead of shared drinking cups and hand towels.   Do not share food and eating utensils with others.   Stay away from people known to be sick.   Care Alerts  Call 911 if:  You have trouble breathing or swallowing.   Your feel like your throat or tongue are swelling.   Call Your Healthcare Provider Right Away Or Return To The Emergency Department If:  You are coughing up yellow or greenish phlegm (mucus.)   You have a severe headache that does not get better with acetaminophen or ibuprofen.   You start to have a very stiff neck and have pain when you bend your head forward.   You have a fever higher than 101.5  F (38.6  C) orally that does not go down after taking acetaminophen or ibuprofen.   You have any symptoms that worry you             Thank you for choosing [facility]  for your Health Care. It was a pleasure seeing you at your visit today. Please contact us with any questions or concerns you may have.                   Mar Burns MD                                  To reach your [facility] care team after hours call:   [phone]    Our clinic hours are:     Monday- 7:30 am - 7:00 pm                             Tuesday through Friday- 7:30 am - 5:00 pm                                        Saturday- 8:00 am - 12:00 pm                  Phone:  [phone]    Our pharmacy hours are:      Monday  8:00 am to 7:00 pm      Tuesday through Friday 8:00am to 6:00pm                        Saturday - 9:00 am to 1:00 pm      Sunday : Closed.              Phone:  [phone]      There is also information available at our web site:  [email or web address]    If your provider ordered any lab tests and you do not receive the results within 10 business days, please call the clinic.    If you need a medication refill please contact your pharmacy.  Please allow 2 business days for your refill to be completed.    Our clinic offers telephone visits and e visits.  Please ask one of your team members to explain more.      Use [employer] (secure email communication and access to your chart) to send your primary care provider a message or make an appointment. Ask someone on your Team how to sign up for [employer].                       Follow-ups after your visit        Who to contact     If you have questions or need follow up information about today's clinic visit or your schedule please contact [facility] directly at [phone].  Normal or non-critical lab and imaging results will be communicated to you by MyChart, letter or phone within 4 business days after the clinic has received the results. If you do not hear from us within 7 days, please contact the clinic through [employer] or phone. If you have a critical or abnormal lab result, we will notify you by phone as soon as possible.  Submit refill requests through [employer] or call your pharmacy and they will forward the refill request to us. Please allow 3 business days for your refill to be completed.          Additional Information About Your Visit        [employer] Information     [employer] gives you secure access to your electronic health record. If you see a primary care provider, you can also send messages to your care team and make appointments. If you have questions, please call your primary care clinic.  If you do not have a primary care provider, please  "call [phone] and they will assist you.        Care EveryWhere ID     This is your Care EveryWhere ID. This could be used by other organizations to access your [place] medical records  [id number]        Your Vitals Were     Pulse Temperature Height Pulse Oximetry BMI (Body Mass Index)       68 97.2  F (36.2  C) (Tympanic) 6' 3.75\" (1.924 m) 99% 31.12 kg/m2        Blood Pressure from Last 3 Encounters:   02/12/18 130/82   03/23/17 124/68   03/20/17 118/80    Weight from Last 3 Encounters:   02/12/18 254 lb (115.2 kg)   03/23/17 253 lb (114.8 kg)   03/20/17 253 lb (114.8 kg)              We Performed the Following     Strep, Rapid Screen          Today's Medication Changes          These changes are accurate as of 2/12/18  4:41 PM.  If you have any questions, ask your nurse or doctor.               Start taking these medicines.        Dose/Directions    amoxicillin 500 MG capsule   Commonly known as:  AMOXIL   Used for:  Acute non-recurrent streptococcal tonsillitis   Started by:  Mar Burns MD        Dose:  500 mg   Take 1 capsule (500 mg) by mouth 2 times daily for 10 days   Quantity:  20 capsule   Refills:  0            Where to get your medicines      These medications were sent to [facility] [place] [address]     Phone:  [phone]     amoxicillin 500 MG capsule                Primary Care Provider Office Phone # Fax #    Elder King [phone]       [address] [place], [place] [address]        Equal Access to Services     [facility] AH: Mitchell Albert, waesperanza ludilia, qaybta kaalbarak alvarez. So [facility] [phone].    ATENCIÓN: Si habla español, tiene a henriquez disposición servicios gratuitos de asistencia lingüística. Lauren rolon [phone].    We comply with applicable federal civil rights laws and [place] laws. We do " not discriminate on the basis of race, color, national origin, age, disability, sex, sexual orientation, or gender identity.            Thank you!     Thank you for choosing [facility]  for your care. Our goal is always to provide you with excellent care. Hearing back from our patients is one way we can continue to improve our services. Please take a few minutes to complete the written survey that you may receive in the mail after your visit with us. Thank you!             Your Updated Medication List - Protect others around you: Learn how to safely use, store and throw away your medicines at www.disposemymeds.org.          This list is accurate as of 2/12/18  4:41 PM.  Always use your most recent med list.                   Brand Name Dispense Instructions for use Diagnosis    amoxicillin 500 MG capsule    AMOXIL    20 capsule    Take 1 capsule (500 mg) by mouth 2 times daily for 10 days    Acute non-recurrent streptococcal tonsillitis       fluticasone 50 MCG/ACT spray    FLONASE    3 Package    Spray 1-2 sprays into both nostrils daily    Nasal congestion       hydrocortisone 0.2 % cream    WESTCORT    60 g    Apply sparingly to affected area three times daily as needed.    Eczema, unspecified eczema       * sildenafil 20 MG tablet    REVATIO    30 tablet    TAKE ONE TABLET BY MOUTH EVERY DAY AS NEEDED    Erectile dysfunction, unspecified erectile dysfunction type       * sildenafil 20 MG tablet    REVATIO    30 tablet    Take 1 tablet (20 mg) by mouth daily as needed    Erectile dysfunction, unspecified erectile dysfunction type       * Notice:  This list has 2 medication(s) that are the same as other medications prescribed for you. Read the directions carefully, and ask your doctor or other care provider to review them with you.

## 2018-02-12 NOTE — PATIENT INSTRUCTIONS
Pharyngitis Strep (Strep Throat) Adult   Information About Your Condition:  Description  Sore throat is a common symptom that ranges in severity from just a sense of scratchiness to severe pain. Pharyngitis is the medical term for sore throat.   Strep throat is an inflamed (red and swollen) throat caused by infection with a kind of bacteria called group A Streptococci. With treatment, the fever and sore throat pain are usually gone within 24 hours. After taking antibiotics for 24 hours you are no longer contagious. It is important to treat strep throat to prevent some rare but serious complications such as rheumatic fever (a disease that affects the heart) or glomerulonephritis (a disease that affects the kidneys).   Symptoms   The symptoms of a strep infection may include one or more of the following:   sore, red throat   painful swallowing   fever   chills   headaches   muscle aches and pains   tired feeling   swollen, tender lymph nodes (glands) in the neck   loss of appetite   Causes  Strep throat is caused by infection with bacteria called Group A Streptococci. Strep infections are very contagious. They are usually passed directly from person to person. Strep throat is most common in school-age children, who then often pass it to the rest of the family. It most often occurs from November through April, but it can happen any time of year.   What You Should Do At Home (Follow-up Care)   It is important to get plenty of rest when you are not feeling well so that your body may focus its energy on getting you healthy.   If you smoke, stop. If someone else in your household smokes ask them to smoke outside.   If you were given a prescription for antibiotics, be sure to get it filled right away. Follow the directions exactly. Take the medicine until it is completely gone. Do not stop taking it just because you feel better.   Acetaminophen (Tylenol ) or over-the-counter anti-inflammatory medicine such as  ibuprofen (Motrin , Advil ) or naproxen (Aleve , Naprosyn ) may help decrease fever and pain. You should not take ibuprofen or naproxen if you have a history of bleeding in your stomach.   As long as your healthcare provider has not told you differently, drink plenty of liquids. An average adult should drink at least 6 to 10 eight-ounce glasses of liquids that do not contain alcohol (including beer or wine), such as water, juice, or weak tea each day. One way to tell if you are drinking enough liquid is to look at the color of your urine (pee). It should be very light yellow.   If eating hurts your throat, don't force yourself to eat solid food. When you are able to eat more foods, choose healthy food to give you strength and to help fight the infection.   If the air in your home is dry, a cool-mist humidifier can moisten the air and help make breathing easier. Be sure to clean your humidifier often so that bacteria and mold can t grow. You can also try running hot water in the shower or bathtub to steam up the bathroom. Sit in there for 10 to 15 minutes if you are coughing hard or having trouble breathing.   Gargle with salt water. Stir 1/2 teaspoon salt in an 8-ounce glass of warm water to make your own salt water gargle.   Suck on lozenges or hard candy.   Don't talk a lot. Rest your voice.   Avoid close contact with other people until you have been taking the antibiotic for 24 to 48 hours so they will not be exposed to the strep bacteria.   Cover your nose and mouth with a tissue when coughing or sneezing and then throw it away in the nearest waste receptacle.   Wash your hands often with warm water and soap for at least 15 seconds before you touch food, dishes, glasses, silverware, or cloth napkins. You can also carry an alcohol-based hand  with you to clean your hands when soap and water aren t available.   Wash your hands after you cough.   Please keep all medicines out of the reach of children.   What  You Can Do To Stay Healthy  Be careful not to let your nose or mouth touch public telephones or drinking fountains.   Use paper cups and paper towels in bathrooms instead of shared drinking cups and hand towels.   Do not share food and eating utensils with others.   Stay away from people known to be sick.   Care Alerts  Call 911 if:  You have trouble breathing or swallowing.   Your feel like your throat or tongue are swelling.   Call Your Healthcare Provider Right Away Or Return To The Emergency Department If:  You are coughing up yellow or greenish phlegm (mucus.)   You have a severe headache that does not get better with acetaminophen or ibuprofen.   You start to have a very stiff neck and have pain when you bend your head forward.   You have a fever higher than 101.5  F (38.6  C) orally that does not go down after taking acetaminophen or ibuprofen.   You have any symptoms that worry you             Thank you for choosing [facility]  for your Health Care. It was a pleasure seeing you at your visit today. Please contact us with any questions or concerns you may have.                   Mar Burns MD                                  To reach your [facility] care team after hours call:   [phone]    Our clinic hours are:     Monday- 7:30 am - 7:00 pm                             Tuesday through Friday- 7:30 am - 5:00 pm                                        Saturday- 8:00 am - 12:00 pm                  Phone:  [phone]    Our pharmacy hours are:     Monday  8:00 am to 7:00 pm      Tuesday through Friday 8:00am to 6:00pm                        Saturday - 9:00 am to 1:00 pm      Sunday : Closed.              Phone:  [phone]      There is also information available at our web site:  [email or web address]    If your provider ordered any lab tests and you do not receive the results within 10 business days, please call the clinic.    If you need a medication refill  please contact your pharmacy.  Please allow 2 business days for your refill to be completed.    Our clinic offers telephone visits and e visits.  Please ask one of your team members to explain more.      Use [employer] (secure email communication and access to your chart) to send your primary care provider a message or make an appointment. Ask someone on your Team how to sign up for [employer].

## 2018-04-23 ENCOUNTER — OFFICE VISIT (OUTPATIENT)
Dept: URGENT CARE | Facility: URGENT CARE | Age: 53
End: 2018-04-23
Payer: COMMERCIAL

## 2018-04-23 VITALS
RESPIRATION RATE: 16 BRPM | HEIGHT: 77 IN | DIASTOLIC BLOOD PRESSURE: 64 MMHG | HEART RATE: 70 BPM | OXYGEN SATURATION: 97 % | BODY MASS INDEX: 29.99 KG/M2 | WEIGHT: 254 LBS | TEMPERATURE: 98.2 F | SYSTOLIC BLOOD PRESSURE: 116 MMHG

## 2018-04-23 DIAGNOSIS — R07.0 THROAT PAIN: Primary | ICD-10-CM

## 2018-04-23 LAB
DEPRECATED S PYO AG THROAT QL EIA: NORMAL
SPECIMEN SOURCE: NORMAL

## 2018-04-23 PROCEDURE — 87880 STREP A ASSAY W/OPTIC: CPT | Performed by: PHYSICIAN ASSISTANT

## 2018-04-23 PROCEDURE — 87081 CULTURE SCREEN ONLY: CPT | Performed by: PHYSICIAN ASSISTANT

## 2018-04-23 PROCEDURE — 99213 OFFICE O/P EST LOW 20 MIN: CPT | Performed by: PHYSICIAN ASSISTANT

## 2018-04-23 NOTE — NURSING NOTE
"Chief Complaint   Patient presents with     Pharyngitis     ST, fatigue, slight cough x 2 days, took Tylenol and cough drops       Initial /64 (BP Location: Right arm, Patient Position: Chair, Cuff Size: Adult Large)  Pulse 70  Temp 98.2  F (36.8  C) (Oral)  Resp 16  Ht 6' 4.5\" (1.943 m)  Wt 254 lb (115.2 kg)  SpO2 97%  BMI 30.52 kg/m2 Estimated body mass index is 30.52 kg/(m^2) as calculated from the following:    Height as of this encounter: 6' 4.5\" (1.943 m).    Weight as of this encounter: 254 lb (115.2 kg).  Medication Reconciliation: eryn Will CMA      "

## 2018-04-23 NOTE — MR AVS SNAPSHOT
"              After Visit Summary   4/23/2018    Herman Joseph    MRN: [id number]           Patient Information     Date Of Birth          1965        Visit Information        Provider Department      4/23/2018 5:15 PM Keiko Beckett PA-C [facility]        Today's Diagnoses     Throat pain    -  1       Follow-ups after your visit        Who to contact     If you have questions or need follow up information about today's clinic visit or your schedule please contact [facility] directly at [phone].  Normal or non-critical lab and imaging results will be communicated to you by [employer], letter or phone within 4 business days after the clinic has received the results. If you do not hear from us within 7 days, please contact the clinic through [employer] or phone. If you have a critical or abnormal lab result, we will notify you by phone as soon as possible.  Submit refill requests through [employer] or call your pharmacy and they will forward the refill request to us. Please allow 3 business days for your refill to be completed.          Additional Information About Your Visit        MyChart Information     [employer] gives you secure access to your electronic health record. If you see a primary care provider, you can also send messages to your care team and make appointments. If you have questions, please call your primary care clinic.  If you do not have a primary care provider, please call [phone] and they will assist you.        Care EveryWhere ID     This is your Care EveryWhere ID. This could be used by other organizations to access your [place] medical records  [id number]        Your Vitals Were     Pulse Temperature Respirations Height Pulse Oximetry BMI (Body Mass Index)    70 98.2  F (36.8  C) (Oral) 16 6' 4.5\" (1.943 m) 97% 30.52 kg/m2       Blood Pressure from Last 3 Encounters:   04/23/18 116/64   02/12/18 130/82   03/23/17 124/68    Weight from Last 3 " Encounters:   04/23/18 254 lb (115.2 kg)   02/12/18 254 lb (115.2 kg)   03/23/17 253 lb (114.8 kg)              We Performed the Following     Beta strep group A culture     Strep, Rapid Screen        Primary Care Provider Office Phone # Fax #    Elder King [phone]       [address] [place] [address]        Equal Access to Services     STEF MILES : Hadii aad ku hadasho Soomaali, waaxda luqadaha, qaybta kaalmada adeegyada, waxay idiin hayaan adeeg ajjanet lamikael . So [facility] [phone].    ATENCIÓN: Si josela espfelicitas, tiene a henriquez disposición servicios gratuitos de asistencia lingüística. Llame al [phone].    We comply with applicable federal civil rights laws and [place] laws. We do not discriminate on the basis of race, color, national origin, age, disability, sex, sexual orientation, or gender identity.            Thank you!     Thank you for choosing [facility]RG[facility]  for your care. Our goal is always to provide you with excellent care. Hearing back from our patients is one way we can continue to improve our services. Please take a few minutes to complete the written survey that you may receive in the mail after your visit with us. Thank you!             Your Updated Medication List - Protect others around you: Learn how to safely use, store and throw away your medicines at www.disposemymeds.org.          This list is accurate as of 4/23/18 10:19 PM.  Always use your most recent med list.                   Brand Name Dispense Instructions for use Diagnosis    fluticasone 50 MCG/ACT spray    FLONASE    3 Package    Spray 1-2 sprays into both nostrils daily    Nasal congestion       hydrocortisone 0.2 % cream    WESTCORT    60 g    Apply sparingly to affected area three times daily as needed.    Eczema, unspecified eczema       sildenafil 20 MG tablet    REVATIO    30 tablet    TAKE ONE TABLET BY MOUTH EVERY DAY AS NEEDED    Erectile dysfunction, unspecified  erectile dysfunction type

## 2018-04-24 LAB
BACTERIA SPEC CULT: NORMAL
SPECIMEN SOURCE: NORMAL

## 2018-04-24 NOTE — PROGRESS NOTES
SUBJECTIVE:   Herman Joseph is a 53 year old male presenting with a chief complaint of   Chief Complaint   Patient presents with     Pharyngitis     ST, fatigue, slight cough x 2 days, took Tylenol and cough drops       He is an established patient of [facility].    URI Adult    Onset of symptoms was 2 day(s) ago.  Course of illness is same.    Severity moderate  Current and Associated symptoms: cough - non-productive, sore throat and fatigue  Treatment measures tried include Tylenol/Ibuprofen.  Predisposing factors include None.        Review of Systems   As noted in HPI.  Negative for fever.    Past Medical History:   Diagnosis Date     CARDIOVASCULAR SCREENING; LDL GOAL LESS THAN 130      Cellulitis      Chronic sinusitis      ED (erectile dysfunction)      Esophageal reflux 6/20/2002     Kyphoscoliosis and scoliosis      Major depressive disorder, single episode, moderate (H) 9/11/2004     URETHRAL STRICTURE NEC 9/11/2004     Family History   Problem Relation Age of Onset     CANCER Father      throat     Depression Sister      Anxiety Disorder Sister      CANCER Maternal Grandmother      ovarian     HEART DISEASE Maternal Grandmother      DIABETES Maternal Uncle      HEART DISEASE Paternal Grandfather      ??     Depression Sister      Anxiety Disorder Sister      Alcohol/Drug Brother      Depression Sister      Anxiety Disorder Sister      Depression Sister      Anxiety Disorder Sister      Cancer - colorectal No family hx of      Prostate Cancer No family hx of      Current Outpatient Prescriptions   Medication Sig Dispense Refill     fluticasone (FLONASE) 50 MCG/ACT nasal spray Spray 1-2 sprays into both nostrils daily 3 Package 1     hydrocortisone (WESTCORT) 0.2 % cream Apply sparingly to affected area three times daily as needed. 60 g 2     sildenafil (REVATIO/VIAGRA) 20 MG tablet TAKE ONE TABLET BY MOUTH EVERY DAY AS NEEDED 30 tablet 0     [DISCONTINUED] sildenafil (REVATIO/VIAGRA) 20 MG tablet Take 1  "tablet (20 mg) by mouth daily as needed 30 tablet 1     Social History   Substance Use Topics     Smoking status: Never Smoker     Smokeless tobacco: Never Used     Alcohol use 1.8 - 2.4 oz/week     3 - 4 Standard drinks or equivalent per week      Comment: 6 per month avg       OBJECTIVE  /64 (BP Location: Right arm, Patient Position: Chair, Cuff Size: Adult Large)  Pulse 70  Temp 98.2  F (36.8  C) (Oral)  Resp 16  Ht 6' 4.5\" (1.943 m)  Wt 254 lb (115.2 kg)  SpO2 97%  BMI 30.52 kg/m2    Physical Exam   ENT: tympanic membranes are clear, throat is moist and pink.  Lungs are clear to auscultation bilaterally.  Chest with normal heart tones S1-S2.    Labs:  Results for orders placed or performed in visit on 04/23/18 (from the past 24 hour(s))   Strep, Rapid Screen   Result Value Ref Range    Specimen Description Throat     Rapid Strep A Screen       NEGATIVE: No Group A streptococcal antigen detected by immunoassay, await culture report.           ASSESSMENT:      ICD-10-CM    1. Throat pain R07.0 Strep, Rapid Screen     Beta strep group A culture        Medical Decision Making:    Differential Diagnosis:  URI Adult/Peds:  Tonsilitis, Viral pharyngitis, Viral syndrome and Viral upper respiratory illness    Serious Comorbid Conditions:  Adult:  None    PLAN:  Acute pharyngitis: Rapid strep is negative today, culture is pending.  Supportive care measures advised at this time.  We will communicate any positive finding on the throat culture result.  Follow-up if any worsening symptoms.  Patient understands and agrees with the plan.      Followup:    If not improving or if condition worsens, follow up with your Primary Care Provider      "

## 2018-04-27 ENCOUNTER — OFFICE VISIT (OUTPATIENT)
Dept: FAMILY MEDICINE | Facility: CLINIC | Age: 53
End: 2018-04-27
Payer: COMMERCIAL

## 2018-04-27 VITALS
HEART RATE: 67 BPM | SYSTOLIC BLOOD PRESSURE: 146 MMHG | TEMPERATURE: 98 F | BODY MASS INDEX: 29.99 KG/M2 | WEIGHT: 254 LBS | DIASTOLIC BLOOD PRESSURE: 84 MMHG | HEIGHT: 77 IN | OXYGEN SATURATION: 96 %

## 2018-04-27 DIAGNOSIS — R06.83 SNORING: ICD-10-CM

## 2018-04-27 DIAGNOSIS — R21 RASH: ICD-10-CM

## 2018-04-27 DIAGNOSIS — L30.9 ECZEMA, UNSPECIFIED TYPE: ICD-10-CM

## 2018-04-27 DIAGNOSIS — R25.2 LEG CRAMPS: ICD-10-CM

## 2018-04-27 DIAGNOSIS — J32.9 CHRONIC SINUSITIS, UNSPECIFIED LOCATION: ICD-10-CM

## 2018-04-27 DIAGNOSIS — E66.811 CLASS 1 OBESITY WITHOUT SERIOUS COMORBIDITY WITH BODY MASS INDEX (BMI) OF 30.0 TO 30.9 IN ADULT, UNSPECIFIED OBESITY TYPE: ICD-10-CM

## 2018-04-27 DIAGNOSIS — R68.2 DRY MOUTH: ICD-10-CM

## 2018-04-27 DIAGNOSIS — Z91.09 ENVIRONMENTAL ALLERGIES: ICD-10-CM

## 2018-04-27 DIAGNOSIS — Z51.81 MEDICATION MONITORING ENCOUNTER: ICD-10-CM

## 2018-04-27 DIAGNOSIS — M54.42 ACUTE LEFT-SIDED LOW BACK PAIN WITH LEFT-SIDED SCIATICA: Primary | ICD-10-CM

## 2018-04-27 LAB
ALBUMIN SERPL-MCNC: 3.7 G/DL (ref 3.4–5)
ALBUMIN UR-MCNC: NEGATIVE MG/DL
ALP SERPL-CCNC: 81 U/L (ref 40–150)
ALT SERPL W P-5'-P-CCNC: 33 U/L (ref 0–70)
ANION GAP SERPL CALCULATED.3IONS-SCNC: 6 MMOL/L (ref 3–14)
APPEARANCE UR: CLEAR
AST SERPL W P-5'-P-CCNC: 17 U/L (ref 0–45)
BASOPHILS # BLD AUTO: 0 10E9/L (ref 0–0.2)
BASOPHILS NFR BLD AUTO: 0.4 %
BILIRUB SERPL-MCNC: 1 MG/DL (ref 0.2–1.3)
BILIRUB UR QL STRIP: NEGATIVE
BUN SERPL-MCNC: 13 MG/DL (ref 7–30)
CALCIUM SERPL-MCNC: 9.4 MG/DL (ref 8.5–10.1)
CHLORIDE SERPL-SCNC: 107 MMOL/L (ref 94–109)
CO2 SERPL-SCNC: 29 MMOL/L (ref 20–32)
COLOR UR AUTO: YELLOW
CREAT SERPL-MCNC: 0.89 MG/DL (ref 0.66–1.25)
DIFFERENTIAL METHOD BLD: NORMAL
EOSINOPHIL # BLD AUTO: 0.2 10E9/L (ref 0–0.7)
EOSINOPHIL NFR BLD AUTO: 3.6 %
ERYTHROCYTE [DISTWIDTH] IN BLOOD BY AUTOMATED COUNT: 13.5 % (ref 10–15)
GFR SERPL CREATININE-BSD FRML MDRD: 90 ML/MIN/1.7M2
GLUCOSE SERPL-MCNC: 84 MG/DL (ref 70–99)
GLUCOSE UR STRIP-MCNC: NEGATIVE MG/DL
HCT VFR BLD AUTO: 44 % (ref 40–53)
HGB BLD-MCNC: 14.8 G/DL (ref 13.3–17.7)
HGB UR QL STRIP: ABNORMAL
KETONES UR STRIP-MCNC: NEGATIVE MG/DL
LEUKOCYTE ESTERASE UR QL STRIP: NEGATIVE
LYMPHOCYTES # BLD AUTO: 1.3 10E9/L (ref 0.8–5.3)
LYMPHOCYTES NFR BLD AUTO: 23.6 %
MAGNESIUM SERPL-MCNC: 2.3 MG/DL (ref 1.6–2.3)
MCH RBC QN AUTO: 30.7 PG (ref 26.5–33)
MCHC RBC AUTO-ENTMCNC: 33.6 G/DL (ref 31.5–36.5)
MCV RBC AUTO: 91 FL (ref 78–100)
MONOCYTES # BLD AUTO: 0.5 10E9/L (ref 0–1.3)
MONOCYTES NFR BLD AUTO: 8.7 %
NEUTROPHILS # BLD AUTO: 3.5 10E9/L (ref 1.6–8.3)
NEUTROPHILS NFR BLD AUTO: 63.7 %
NITRATE UR QL: NEGATIVE
PH UR STRIP: 7 PH (ref 5–7)
PLATELET # BLD AUTO: 235 10E9/L (ref 150–450)
POTASSIUM SERPL-SCNC: 4.4 MMOL/L (ref 3.4–5.3)
PROT SERPL-MCNC: 7.5 G/DL (ref 6.8–8.8)
RBC # BLD AUTO: 4.82 10E12/L (ref 4.4–5.9)
RBC #/AREA URNS AUTO: NORMAL /HPF
SODIUM SERPL-SCNC: 142 MMOL/L (ref 133–144)
SOURCE: ABNORMAL
SP GR UR STRIP: 1.01 (ref 1–1.03)
UROBILINOGEN UR STRIP-ACNC: 0.2 EU/DL (ref 0.2–1)
WBC # BLD AUTO: 5.5 10E9/L (ref 4–11)
WBC #/AREA URNS AUTO: NORMAL /HPF

## 2018-04-27 PROCEDURE — 86003 ALLG SPEC IGE CRUDE XTRC EA: CPT | Mod: 59 | Performed by: FAMILY MEDICINE

## 2018-04-27 PROCEDURE — 99214 OFFICE O/P EST MOD 30 MIN: CPT | Performed by: FAMILY MEDICINE

## 2018-04-27 PROCEDURE — 81001 URINALYSIS AUTO W/SCOPE: CPT | Performed by: FAMILY MEDICINE

## 2018-04-27 PROCEDURE — 83735 ASSAY OF MAGNESIUM: CPT | Performed by: FAMILY MEDICINE

## 2018-04-27 PROCEDURE — 80053 COMPREHEN METABOLIC PANEL: CPT | Performed by: FAMILY MEDICINE

## 2018-04-27 PROCEDURE — 36415 COLL VENOUS BLD VENIPUNCTURE: CPT | Performed by: FAMILY MEDICINE

## 2018-04-27 PROCEDURE — 86003 ALLG SPEC IGE CRUDE XTRC EA: CPT | Performed by: FAMILY MEDICINE

## 2018-04-27 PROCEDURE — 86235 NUCLEAR ANTIGEN ANTIBODY: CPT | Mod: 59 | Performed by: FAMILY MEDICINE

## 2018-04-27 PROCEDURE — 86235 NUCLEAR ANTIGEN ANTIBODY: CPT | Performed by: FAMILY MEDICINE

## 2018-04-27 PROCEDURE — 85025 COMPLETE CBC W/AUTO DIFF WBC: CPT | Performed by: FAMILY MEDICINE

## 2018-04-27 RX ORDER — TRIAMCINOLONE ACETONIDE 5 MG/G
CREAM TOPICAL
Qty: 120 G | Refills: 1 | Status: SHIPPED | OUTPATIENT
Start: 2018-04-27 | End: 2020-01-21

## 2018-04-27 NOTE — MR AVS SNAPSHOT
After Visit Summary   4/27/2018    Herman Joseph    MRN: [id number]           Patient Information     Date Of Birth          1965        Visit Information        Provider Department      4/27/2018 10:00 AM Elder King MD [facility]        Today's Diagnoses     Acute left-sided low back pain with left-sided sciatica    -  1    Chronic sinusitis, unspecified location        Leg cramps        Dry mouth        Snoring        Rash        Eczema, unspecified type        Environmental allergies        Class 1 obesity without serious comorbidity with body mass index (BMI) of 30.0 to 30.9 in adult, unspecified obesity type        Medication monitoring encounter          Care Instructions    Biotene mouthwash for mouth dryness    Follow up with Sleep Medicine for sleep study    Triamcinolone cream for right ankle rash    Follow up for fasting physical exam          Low Back Pain        What is low back pain?   Low back pain is pain and stiffness in the lower back. It is one of the most common reasons people miss work.   How does it occur?   Your lower back is called your lumbar spine. It is made up of 5 bones called lumbar vertebrae. In between the vertebrae are shock absorbers called disks. Back pain can occur from an injury to the vertebrae or when a disk bulges or herniates.   Low back pain is usually caused when a ligament or muscle holding a vertebra in its proper position is strained. Vertebrae are bones that make up the spinal column through which the spinal cord passes. When these muscles or ligaments become weak or strained, the spine loses its stability, resulting in pain.   Low back pain can occur if your job involves lifting and carrying heavy objects, or if you spend a lot of time sitting or standing in one position or bending over. It can be caused by a fall or by unusually strenuous exercise. It can be brought on by the tension and stress that cause headaches in some  people. It can even be brought on by violent sneezing or coughing.   People who are overweight may have low back pain because of the added stress on their back.   Back pain may occur when the muscles, joints, bones, and connective tissues of the back become inflamed as a result of an infection or an immune system problem. Arthritic disorders as well as some congenital and degenerative conditions may cause back pain.   Back pain accompanied by loss of bladder or bowel control, trouble moving your legs, or numbness or tingling in your arms or legs requires immediate medical treatment.   What are the symptoms?   Symptoms include:   pain in the back or legs   stiffness, spasm, or limited motion   The pain may be constant or may happen only in certain positions. It may get worse when you cough, sneeze, bend, twist, or strain during a bowel movement. The pain may be in only one spot or may spread to other areas, most commonly down the buttocks and into the back of the thigh.   A low back strain typically does not produce pain past the knee into the calf or foot. Tingling or numbness in the calf or foot may indicate a herniated disk or pinched nerve.   Be sure to see your healthcare provider if:   You have weakness in your leg, especially if you cannot lift your foot, because this may be a sign of nerve damage.   You have new bowel or bladder problems as well as back pain, which may be a sign of severe injury to your spinal cord.   You have pain that gets worse despite treatment.   How is it diagnosed?   Your healthcare provider will review your medical history and examine you. You may have X-rays, an MRI, CT scan, or a bone scan.   How is it treated?   To treat this condition:   Put an ice pack, gel pack, or package of frozen vegetables, wrapped in a cloth on the area every 3 to 4 hours, for up to 20 minutes at a time for the first 2 or 3 days.   Use a heating pad or hot water bottle. Don't let the heating pad get too  hot, and don't fall asleep with it. You could get a burn.   Rest in bed on a firm mattress. Often it helps to lie on your back with your knees raised on a pillow. However, some people prefer to lie on their side with their knees bent. It's best to try to stay active, so try not to rest in bed longer than 1 to 2 days.   Take muscle relaxants as recommended by your healthcare provider.   Take an anti-inflammatory such as ibuprofen, or other medicine as directed by your provider. Nonsteroidal anti-inflammatory medicines (NSAIDs) may cause stomach bleeding and other problems. These risks increase with age. Read the label and take as directed. Unless recommended by your healthcare provider, do not take for more than 10 days.   Get a back massage by a trained person.   Wear a belt or corset to support your back.   Do the exercises recommended by your provider. Your provider may also prescribe physical therapy.   Talk with a counselor, if your back pain is related to tension caused by emotional problems.   When the pain is gone, ask your healthcare provider about starting an exercise program such as the following:   Exercise moderately every day, using stretching and warm-up exercises suggested by your provider or physical therapist.   Exercise vigorously for about 30 minutes 3 times a week by walking, swimming, using a stationary bicycle, or doing low-impact aerobics.   Exercising regularly will not only help your back, it will also help keep you healthier overall.   How long will the effects last?   The effects of back pain last as long as the cause exists or until your body recovers from the strain, usually a day or two but sometimes weeks.   How can I take care of myself?   In addition to the treatment described above, keep in mind these suggestions:   Practice good posture. Stand with your head up, shoulders straight, chest forward, weight balanced evenly on both feet, and pelvis tucked in.   Lose weight if you are  overweight   Keep your core muscles strong. These are your abdominal and back muscles.   Sleep without a pillow under your head.   Pain is the best way to  the pace you should set in increasing your activity and exercise. Minor discomfort, stiffness, soreness, and mild aches need not interfere with activity. However, limit your activities temporarily if:   Your symptoms return.   The pain increases when you are more active.   The pain increases within 24 hours after a new or higher level of activity.   When can I return to my normal activities?   Everyone recovers from an injury at a different rate. Return to your activities depends on how soon your back recovers, not by how many days or weeks it has been since your injury has occurred. In general, the longer you have symptoms before you start treatment, the longer it will take to get better. The goal is to return to your normal activities as soon as is safely possible. If you return too soon you may worsen your injury.   It is important that you have fully recovered from your low back pain before you return to any strenuous activity. You must be able to have the same range of motion that you had before your injury. You must be able to walk and twist without pain.   What can I do to help prevent low back pain?   You can reduce the strain on your back by doing the following:   Don't push with your arms when you move a heavy object. Turn around and push backwards so the strain is taken by your legs.   Whenever you sit, sit in a straight-backed chair and hold your spine against the back of the chair.   Bend your knees and hips and keep your back straight when you lift a heavy object.   Avoid lifting heavy objects higher than your waist.   Hold packages you carry close to your body, with your arms bent.   Use a footrest for one foot when you stand or sit in one spot for a long time. This keeps your back straight.   Bend your knees when you bend over.   Sit close to  the pedals when you drive and use your seat belt and a hard backrest or pillow.   Lie on your side with your knees bent when you sleep or rest. It may help to put a pillow between your knees.   Put a pillow under your knees when you sleep on your back.   Raise the foot of the bed 8 inches to discourage sleeping on your stomach unless you have other problems that require that you keep your head elevated.   To rest your back, hold each of these positions for 5?minutes or longer:   Lie on your back, bend your knees, and put pillows under your knees.   Lie on your back on the floor with a pillow under your neck. Bend your knees to a 90-degree angle, and put your lower legs and feet on a chair.   Lie on your back, bend your knees, and bring one knee up to your chest and hold it there. Repeat with the other knee, then bring both knees to your chest. When holding your knee to your chest, grab your thigh rather than your lower leg to avoid over flexing your knee.     Published by [employer].  This content is reviewed periodically and is subject to change as new health information becomes available. The information is intended to inform and educate and is not a replacement for medical evaluation, advice, diagnosis or treatment by a healthcare professional.   Developed by Shilpi Sandoval RN, [place], and [facility].   ? 2010 [facility] and/or its affiliates. All Rights Reserved.           Low Back Pain Exercise      Standing hamstring stretch: Put the heel of one leg on a stool about 15 inches high. Keep your leg straight. Lean forward, bending at the hips until you feel a mild stretch in the back of your thigh. Make sure you do not roll your shoulders or bend at the waist when doing this. You want to stretch your leg, not your lower back. Hold the stretch for 15 to 30 seconds. Repeat with each leg 3 times.   Cat and camel: Get down on your hands and knees. Let your stomach sag, allowing your back to curve downward. Hold  this position for 5 seconds. Then arch your back and hold for 5 seconds. Do 3 sets of 10.   Quadruped arm and leg raise: Get down on your hands and knees. Pull in your belly button and tighten your abdominal muscles to stiffen your spine. While keeping your abdominals tight, raise one arm and the opposite leg away from you. Hold this position for 5 seconds. Lower your arm and leg slowly and change sides. Do this 10 times on each side.   Pelvic tilt: Lie on your back with your knees bent and your feet flat on the floor. Tighten your abdominal muscles and push your lower back into the floor. Hold this position for 5 seconds, then relax. Do 3 sets of 10.   Partial curl: Lie on your back with your knees bent and your feet flat on the floor. Tighten your stomach muscles. Tuck your chin to your chest. With your hands stretched out in front of you, curl your upper body forward until your shoulders clear the floor. Hold this position for 3 seconds. Don't hold your breath. It helps to breathe out as you lift your shoulders up. Relax back to the floor. Repeat 10 times. Build to 3 sets of 10. To challenge yourself, clasp your hands behind your head and keep your elbows out to the side.   Gluteal stretch: Lie on your back with both knees bent. Rest the ankle of one leg over the knee of your other leg. Grasp the thigh of the bottom leg and pull toward your chest. You will feel a stretch along the buttocks and possibly along the outside of your hip. Hold the stretch for 15 to 30 seconds. Repeat 3 times with each leg.   Extension exercise:   0. Lie face down on the floor for 5 minutes. If this hurts too much, lie face down with a pillow under your stomach. This should relieve your leg or back pain. When you can lie on your stomach for 5 minutes without a pillow, you can continue with Part B of this exercise.   0. After lying on your stomach for 5 minutes, prop yourself up on your elbows for another 5 minutes. If you can do this  without having more leg or buttock pain, you can start doing part C of this exercise.   0. Lie on your stomach with your hands under your shoulders. Then press down on your hands and extend your elbows while keeping your hips flat on the floor. Hold for 1 second and lower yourself to the floor. Do 3 to 5 sets of 10 repetitions. Rest for 1 minute between sets. You should have no pain in your legs when you do this, but it is normal to feel some pain in your lower back.   Do this exercise several times a day.   Side plank: Lie on your side with your legs, hips, and shoulders in a straight line. Prop yourself up onto your forearm so your elbow is directly under your shoulder. Lift your hips off the floor and balance on your forearm and the outside of your foot. Try to hold this position for 15 seconds, then slowly lower your hip to the ground. Switch sides and repeat. Work up to holding for 1 minute or longer. This exercise can be made easier by starting with your knees and hips flexed toward your chest.   Published by [employer].  This content is reviewed periodically and is subject to change as new health information becomes available. The information is intended to inform and educate and is not a replacement for medical evaluation, advice, diagnosis or treatment by a healthcare professional.   Written by Juliana Booth MS, PT, and Shilpi Santos PT, [facility], [facility], for [employer]   ? 2010 [facility] and/or its affiliates. All Rights Reserved.         Copyright   [employer] 2011              [facility]                        To reach your care team during and after hours:   [phone]  To reach our pharmacy:        [phone]    Clinic Hours                        Our clinic hours are:    Monday   7:30 am to 7:00 pm                  Tuesday through Friday 7:30 am to 5:00 pm                             Saturday   8:00 am to 12:00 pm      Sunday   Closed      Pharmacy Hours                         Our pharmacy hours are:    Monday   8:30 am to 7:00 pm       Tuesday to Friday  8:30 am to 6:00 pm                       Saturday    9:00 am to 1:00 pm              Sunday    Closed              There is also information available at our web site:  [email or web address]    If your provider ordered any lab tests and you do not receive the results within 10 business days, please call the clinic.    If you need a medication refill please contact your pharmacy.  Please allow 2-3 business days for your refill to be completed.    Our clinic offers telephone visits and e visits.  Please ask one of your team members to explain more.      Use [employer] (secure email communication and access to your chart) to send your primary care provider a message or make an appointment. Ask someone on your Team how to sign up for [employer].  Immunizations                      Immunization History   Administered Date(s) Administered     TD (ADULT, 7+) 10/18/1988, 06/19/2000     TDAP Vaccine (Adacel) 05/29/2008        Health Maintenance                         Health Maintenance Due   Topic Date Due     HIV SCREEN (SYSTEM ASSIGNED)  02/02/1983     Flu Vaccine (1) 09/01/2017               Follow-ups after your visit        Additional Services     SLEEP EVALUATION & MANAGEMENT REFERRAL - Pending sale to [facility]  [phone] (Age 18 and up)       Please be aware that coverage of these services is subject to the terms and limitations of your health insurance plan.  Call member services at your health plan with any benefit or coverage questions.      Please bring the following to your appointment:    >>   List of current medications   >>   This referral request   >>   Any documents/labs given to you for this referral                      Follow-up notes from your care team     Return in about 2 months (around 6/27/2018), or if symptoms worsen or fail to improve, for Physical Exam, Lab Work.      Future tests that were  "ordered for you today     Open Future Orders        Priority Expected Expires Ordered    SLEEP EVALUATION & MANAGEMENT REFERRAL - ADULT -[facility]  [phone] (Age 18 and up) Routine  4/27/2019 4/27/2018            Who to contact     If you have questions or need follow up information about today's clinic visit or your schedule please contact [facility] directly at [phone].  Normal or non-critical lab and imaging results will be communicated to you by [employer], letter or phone within 4 business days after the clinic has received the results. If you do not hear from us within 7 days, please contact the clinic through [employer] or phone. If you have a critical or abnormal lab result, we will notify you by phone as soon as possible.  Submit refill requests through [employer] or call your pharmacy and they will forward the refill request to us. Please allow 3 business days for your refill to be completed.          Additional Information About Your Visit        [employer] Information     [employer] gives you secure access to your electronic health record. If you see a primary care provider, you can also send messages to your care team and make appointments. If you have questions, please call your primary care clinic.  If you do not have a primary care provider, please call [phone] and they will assist you.        Care EveryWhere ID     This is your Care EveryWhere ID. This could be used by other organizations to access your [facility] medical records  [id number]        Your Vitals Were     Pulse Temperature Height Pulse Oximetry BMI (Body Mass Index)       67 98  F (36.7  C) (Oral) 6' 4.5\" (1.943 m) 96% 30.52 kg/m2        Blood Pressure from Last 3 Encounters:   04/27/18 146/84   04/23/18 116/64   02/12/18 130/82    Weight from Last 3 Encounters:   04/27/18 254 lb (115.2 kg)   04/23/18 254 lb (115.2 kg)   02/12/18 254 lb (115.2 kg)              We Performed the Following     *UA " reflex to Microscopic and Culture ([facility] (except [place] and [place])     Allergy adult food panel     CBC with platelets and differential     Comprehensive metabolic panel     Magnesium     Midwest Resp Allergen Panel     SSA Ro VAUGHN Antibody IgG     SSB La VAUGHN Antibody IgG          Today's Medication Changes          These changes are accurate as of 4/27/18 10:34 AM.  If you have any questions, ask your nurse or doctor.               Start taking these medicines.        Dose/Directions    triamcinolone 0.5 % cream   Commonly known as:  KENALOG   Used for:  Eczema, unspecified type, Rash   Started by:  Elder King MD        Apply sparingly to affected area three times daily.   Quantity:  120 g   Refills:  1            Where to get your medicines      These medications were sent to [facility] [place] [id number]     Phone:  [phone]     triamcinolone 0.5 % cream                Primary Care Provider Office Phone # Fax #    Elder King [phone]       [address] [place] [id number]        Equal Access to Services     [facility]: Hadii aad ku hadasho Soomaali, waaxda luqadaha, qaybta kaalmada adeegyada, waxay marcosin hayannabelle webb . So [facility] [phone].    ATENCIÓN: Si habla español, tiene a henriquez disposición servicios gratuitos de asistencia lingüística. Llame al [phone].    We comply with applicable federal civil rights laws and [place] laws. We do not discriminate on the basis of race, color, national origin, age, disability, sex, sexual orientation, or gender identity.            Thank you!     Thank you for choosing [facility]  for your care. Our goal is always to provide you with excellent care. Hearing back from our patients is one way we can continue to improve our services. Please take a few minutes to complete the written  survey that you may receive in the mail after your visit with us. Thank you!             Your Updated Medication List - Protect others around you: Learn how to safely use, store and throw away your medicines at www.disposemymeds.org.          This list is accurate as of 4/27/18 10:34 AM.  Always use your most recent med list.                   Brand Name Dispense Instructions for use Diagnosis    fluticasone 50 MCG/ACT spray    FLONASE    3 Package    Spray 1-2 sprays into both nostrils daily    Nasal congestion       hydrocortisone 0.2 % cream    WESTCORT    60 g    Apply sparingly to affected area three times daily as needed.    Eczema, unspecified eczema       sildenafil 20 MG tablet    REVATIO    30 tablet    TAKE ONE TABLET BY MOUTH EVERY DAY AS NEEDED    Erectile dysfunction, unspecified erectile dysfunction type       triamcinolone 0.5 % cream    KENALOG    120 g    Apply sparingly to affected area three times daily.    Eczema, unspecified type, Rash

## 2018-04-27 NOTE — PATIENT INSTRUCTIONS
Biotene mouthwash for mouth dryness    Follow up with Sleep Medicine for sleep study    Triamcinolone cream for right ankle rash    Follow up for fasting physical exam          Low Back Pain        What is low back pain?   Low back pain is pain and stiffness in the lower back. It is one of the most common reasons people miss work.   How does it occur?   Your lower back is called your lumbar spine. It is made up of 5 bones called lumbar vertebrae. In between the vertebrae are shock absorbers called disks. Back pain can occur from an injury to the vertebrae or when a disk bulges or herniates.   Low back pain is usually caused when a ligament or muscle holding a vertebra in its proper position is strained. Vertebrae are bones that make up the spinal column through which the spinal cord passes. When these muscles or ligaments become weak or strained, the spine loses its stability, resulting in pain.   Low back pain can occur if your job involves lifting and carrying heavy objects, or if you spend a lot of time sitting or standing in one position or bending over. It can be caused by a fall or by unusually strenuous exercise. It can be brought on by the tension and stress that cause headaches in some people. It can even be brought on by violent sneezing or coughing.   People who are overweight may have low back pain because of the added stress on their back.   Back pain may occur when the muscles, joints, bones, and connective tissues of the back become inflamed as a result of an infection or an immune system problem. Arthritic disorders as well as some congenital and degenerative conditions may cause back pain.   Back pain accompanied by loss of bladder or bowel control, trouble moving your legs, or numbness or tingling in your arms or legs requires immediate medical treatment.   What are the symptoms?   Symptoms include:   pain in the back or legs   stiffness, spasm, or limited motion   The pain may be constant or may  happen only in certain positions. It may get worse when you cough, sneeze, bend, twist, or strain during a bowel movement. The pain may be in only one spot or may spread to other areas, most commonly down the buttocks and into the back of the thigh.   A low back strain typically does not produce pain past the knee into the calf or foot. Tingling or numbness in the calf or foot may indicate a herniated disk or pinched nerve.   Be sure to see your healthcare provider if:   You have weakness in your leg, especially if you cannot lift your foot, because this may be a sign of nerve damage.   You have new bowel or bladder problems as well as back pain, which may be a sign of severe injury to your spinal cord.   You have pain that gets worse despite treatment.   How is it diagnosed?   Your healthcare provider will review your medical history and examine you. You may have X-rays, an MRI, CT scan, or a bone scan.   How is it treated?   To treat this condition:   Put an ice pack, gel pack, or package of frozen vegetables, wrapped in a cloth on the area every 3 to 4 hours, for up to 20 minutes at a time for the first 2 or 3 days.   Use a heating pad or hot water bottle. Don't let the heating pad get too hot, and don't fall asleep with it. You could get a burn.   Rest in bed on a firm mattress. Often it helps to lie on your back with your knees raised on a pillow. However, some people prefer to lie on their side with their knees bent. It's best to try to stay active, so try not to rest in bed longer than 1 to 2 days.   Take muscle relaxants as recommended by your healthcare provider.   Take an anti-inflammatory such as ibuprofen, or other medicine as directed by your provider. Nonsteroidal anti-inflammatory medicines (NSAIDs) may cause stomach bleeding and other problems. These risks increase with age. Read the label and take as directed. Unless recommended by your healthcare provider, do not take for more than 10 days.   Get a  back massage by a trained person.   Wear a belt or corset to support your back.   Do the exercises recommended by your provider. Your provider may also prescribe physical therapy.   Talk with a counselor, if your back pain is related to tension caused by emotional problems.   When the pain is gone, ask your healthcare provider about starting an exercise program such as the following:   Exercise moderately every day, using stretching and warm-up exercises suggested by your provider or physical therapist.   Exercise vigorously for about 30 minutes 3 times a week by walking, swimming, using a stationary bicycle, or doing low-impact aerobics.   Exercising regularly will not only help your back, it will also help keep you healthier overall.   How long will the effects last?   The effects of back pain last as long as the cause exists or until your body recovers from the strain, usually a day or two but sometimes weeks.   How can I take care of myself?   In addition to the treatment described above, keep in mind these suggestions:   Practice good posture. Stand with your head up, shoulders straight, chest forward, weight balanced evenly on both feet, and pelvis tucked in.   Lose weight if you are overweight   Keep your core muscles strong. These are your abdominal and back muscles.   Sleep without a pillow under your head.   Pain is the best way to  the pace you should set in increasing your activity and exercise. Minor discomfort, stiffness, soreness, and mild aches need not interfere with activity. However, limit your activities temporarily if:   Your symptoms return.   The pain increases when you are more active.   The pain increases within 24 hours after a new or higher level of activity.   When can I return to my normal activities?   Everyone recovers from an injury at a different rate. Return to your activities depends on how soon your back recovers, not by how many days or weeks it has been since your injury  has occurred. In general, the longer you have symptoms before you start treatment, the longer it will take to get better. The goal is to return to your normal activities as soon as is safely possible. If you return too soon you may worsen your injury.   It is important that you have fully recovered from your low back pain before you return to any strenuous activity. You must be able to have the same range of motion that you had before your injury. You must be able to walk and twist without pain.   What can I do to help prevent low back pain?   You can reduce the strain on your back by doing the following:   Don't push with your arms when you move a heavy object. Turn around and push backwards so the strain is taken by your legs.   Whenever you sit, sit in a straight-backed chair and hold your spine against the back of the chair.   Bend your knees and hips and keep your back straight when you lift a heavy object.   Avoid lifting heavy objects higher than your waist.   Hold packages you carry close to your body, with your arms bent.   Use a footrest for one foot when you stand or sit in one spot for a long time. This keeps your back straight.   Bend your knees when you bend over.   Sit close to the pedals when you drive and use your seat belt and a hard backrest or pillow.   Lie on your side with your knees bent when you sleep or rest. It may help to put a pillow between your knees.   Put a pillow under your knees when you sleep on your back.   Raise the foot of the bed 8 inches to discourage sleeping on your stomach unless you have other problems that require that you keep your head elevated.   To rest your back, hold each of these positions for 5?minutes or longer:   Lie on your back, bend your knees, and put pillows under your knees.   Lie on your back on the floor with a pillow under your neck. Bend your knees to a 90-degree angle, and put your lower legs and feet on a chair.   Lie on your back, bend your knees,  and bring one knee up to your chest and hold it there. Repeat with the other knee, then bring both knees to your chest. When holding your knee to your chest, grab your thigh rather than your lower leg to avoid over flexing your knee.     Published by [facility].  This content is reviewed periodically and is subject to change as new health information becomes available. The information is intended to inform and educate and is not a replacement for medical evaluation, advice, diagnosis or treatment by a healthcare professional.   Developed by Shilpi Sandoval RN, [place], and [facility].   ? 2010 [facility] and/or its affiliates. All Rights Reserved.           Low Back Pain Exercise      Standing hamstring stretch: Put the heel of one leg on a stool about 15 inches high. Keep your leg straight. Lean forward, bending at the hips until you feel a mild stretch in the back of your thigh. Make sure you do not roll your shoulders or bend at the waist when doing this. You want to stretch your leg, not your lower back. Hold the stretch for 15 to 30 seconds. Repeat with each leg 3 times.   Cat and camel: Get down on your hands and knees. Let your stomach sag, allowing your back to curve downward. Hold this position for 5 seconds. Then arch your back and hold for 5 seconds. Do 3 sets of 10.   Quadruped arm and leg raise: Get down on your hands and knees. Pull in your belly button and tighten your abdominal muscles to stiffen your spine. While keeping your abdominals tight, raise one arm and the opposite leg away from you. Hold this position for 5 seconds. Lower your arm and leg slowly and change sides. Do this 10 times on each side.   Pelvic tilt: Lie on your back with your knees bent and your feet flat on the floor. Tighten your abdominal muscles and push your lower back into the floor. Hold this position for 5 seconds, then relax. Do 3 sets of 10.   Partial curl: Lie on your back with your knees bent and your feet flat on the  floor. Tighten your stomach muscles. Tuck your chin to your chest. With your hands stretched out in front of you, curl your upper body forward until your shoulders clear the floor. Hold this position for 3 seconds. Don't hold your breath. It helps to breathe out as you lift your shoulders up. Relax back to the floor. Repeat 10 times. Build to 3 sets of 10. To challenge yourself, clasp your hands behind your head and keep your elbows out to the side.   Gluteal stretch: Lie on your back with both knees bent. Rest the ankle of one leg over the knee of your other leg. Grasp the thigh of the bottom leg and pull toward your chest. You will feel a stretch along the buttocks and possibly along the outside of your hip. Hold the stretch for 15 to 30 seconds. Repeat 3 times with each leg.   Extension exercise:   0. Lie face down on the floor for 5 minutes. If this hurts too much, lie face down with a pillow under your stomach. This should relieve your leg or back pain. When you can lie on your stomach for 5 minutes without a pillow, you can continue with Part B of this exercise.   0. After lying on your stomach for 5 minutes, prop yourself up on your elbows for another 5 minutes. If you can do this without having more leg or buttock pain, you can start doing part C of this exercise.   0. Lie on your stomach with your hands under your shoulders. Then press down on your hands and extend your elbows while keeping your hips flat on the floor. Hold for 1 second and lower yourself to the floor. Do 3 to 5 sets of 10 repetitions. Rest for 1 minute between sets. You should have no pain in your legs when you do this, but it is normal to feel some pain in your lower back.   Do this exercise several times a day.   Side plank: Lie on your side with your legs, hips, and shoulders in a straight line. Prop yourself up onto your forearm so your elbow is directly under your shoulder. Lift your hips off the floor and balance on your forearm and  the outside of your foot. Try to hold this position for 15 seconds, then slowly lower your hip to the ground. Switch sides and repeat. Work up to holding for 1 minute or longer. This exercise can be made easier by starting with your knees and hips flexed toward your chest.   Published by [employer].  This content is reviewed periodically and is subject to change as new health information becomes available. The information is intended to inform and educate and is not a replacement for medical evaluation, advice, diagnosis or treatment by a healthcare professional.   Written by Juliana Booth, MS, PT, and Shilpi Santos, PT, [facility], [facility], for [facility]   ? 2010 [facility] and/or its affiliates. All Rights Reserved.         Copyright   [employer] 2011              [facility]                        To reach your care team during and after hours:   [phone]  To reach our pharmacy:        [phone]    Clinic Hours                        Our clinic hours are:    Monday   7:30 am to 7:00 pm                  Tuesday through Friday 7:30 am to 5:00 pm                             Saturday   8:00 am to 12:00 pm      Sunday   Closed      Pharmacy Hours                        Our pharmacy hours are:    Monday   8:30 am to 7:00 pm       Tuesday to Friday  8:30 am to 6:00 pm                       Saturday    9:00 am to 1:00 pm              Sunday    Closed              There is also information available at our web site:  [email or web address]    If your provider ordered any lab tests and you do not receive the results within 10 business days, please call the clinic.    If you need a medication refill please contact your pharmacy.  Please allow 2-3 business days for your refill to be completed.    Our clinic offers telephone visits and e visits.  Please ask one of your team members to explain more.      Use [employer] [facility] [employer] (secure email communication and access to your chart) to send your primary care  provider a message or make an appointment. Ask someone on your Team how to sign up for MyChart.  Immunizations                      Immunization History   Administered Date(s) Administered     TD (ADULT, 7+) 10/18/1988, 06/19/2000     TDAP Vaccine (Adacel) 05/29/2008        Health Maintenance                         Health Maintenance Due   Topic Date Due     HIV SCREEN (SYSTEM ASSIGNED)  02/02/1983     Flu Vaccine (1) 09/01/2017
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
## 2018-04-30 LAB
ENA SS-A IGG SER IA-ACNC: <0.2 AI (ref 0–0.9)
ENA SS-B IGG SER IA-ACNC: <0.2 AI (ref 0–0.9)

## 2018-05-02 LAB
A ALTERNATA IGE QN: <0.1 KU(A)/L
A FUMIGATUS IGE QN: <0.1 KU(A)/L
C HERBARUM IGE QN: <0.1 KU(A)/L
CAT DANDER IGG QN: <0.1 KU(A)/L
CLAM IGE QN: <0.1 KU(A)/L
COCKSFOOT IGE QN: <0.1 KU(A)/L
CODFISH IGE QN: <0.1 KU(A)/L
COMMON RAGWEED IGE QN: <0.1 KU(A)/L
CORN IGE QN: <0.1 KU(A)/L
COTTONWOOD IGE QN: <0.1 KU(A)/L
COW MILK IGE QN: 0.11 KU/L
D FARINAE IGE QN: <0.1 KU(A)/L
D PTERONYSS IGE QN: <0.1 KU(A)/L
DOG DANDER+EPITH IGE QN: <0.1 KU(A)/L
EGG WHITE IGE QN: <0.1 KU(A)/L
KENT BLUE GRASS IGE QN: <0.1 KU(A)/L
MAPLE IGE QN: <0.1 KU(A)/L
PEANUT IGE QN: <0.1 KU(A)/L
ROACH IGE QN: <0.1 KU(A)/L
SCALLOP IGE QN: <0.1 KU(A)/L
SHRIMP IGE QN: <0.1 KU(A)/L
SOYBEAN IGE QN: <0.1 KU(A)/L
TIMOTHY IGE QN: <0.1 KU(A)/L
WALNUT IGE QN: <0.1 KU(A)/L
WHEAT IGE QN: <0.1 KU(A)/L
WHITE ASH IGE QN: <0.1 KU(A)/L
WHITE ELM IGE QN: <0.1 KU(A)/L
WHITE OAK IGE QN: <0.1 KU(A)/L

## 2018-05-04 ENCOUNTER — TELEPHONE (OUTPATIENT)
Dept: FAMILY MEDICINE | Facility: CLINIC | Age: 53
End: 2018-05-04

## 2018-05-04 DIAGNOSIS — J32.9 SINUSITIS: Primary | ICD-10-CM

## 2018-05-04 NOTE — TELEPHONE ENCOUNTER
augmentin 875 mg bid x 10 days, follow up if any issues persist, zyrtec, saline rinses, follow up if any issues

## 2018-05-04 NOTE — TELEPHONE ENCOUNTER
Patient returning call    Please see 04/27/2018 OV    Acute Illness   Acute illness concerns: Sinus  Onset: 1.5-2 months    Fever: no     Chills/Sweats: no    Headache (location?): no    Sinus Pressure:YES- post-nasal drainage    Conjunctivitis:  YES- dry    Ear Pain: no    Rhinorrhea: no     Congestion: YES- mainly in the morning    Sore Throat: YES, raw     Cough: YES-productive of yellow sputum, barking    Wheeze: no     Decreased Appetite: no    Nausea: no    Vomiting: no    Diarrhea:  no    Dysuria/Freq.: no    Fatigue/Achiness: YES- achiness this past weekend    Sick/Strep Exposure: no     Therapies Tried and outcome: Sudafed, Zantac, Cough Drops (Ricola), Zyrtec, pushing water     DENIES: CP, SOB, Difficulty Breathing, Dizziness/Lightheadedness, Numbness/Tingling, HA, Vision/Speech Changes, N/V    Patient states that his allergies are just so bad. Was seen as ENT in the past (~8 years ago) and put on prednisone which really helped.   Patient states that at night he is getting a very dry mouth. In the last 4-5 days, when he wakes up in the morning he is so plugged up that it takes hours just to be able to start talking.     LOV with ENT was 3 years ago. Patient has called Dr. Ferraro's Office and first available appt was for next week. Patient did not schedule.     Ph.   Telephone Information:   Mobile [phone]   OK to leave a detailed VM    Or [phone] (CAITLYN Myrick)    Pharmacy: [facility].     Routing to PCP for further review/recommendations/orders - please advise on lab results from 04/27/2018 and note above.     Racquel Sánchez RN  [place] [facility]

## 2018-05-04 NOTE — TELEPHONE ENCOUNTER
Patient notified in clinic.  Augmentin sent to our clinic pharmacy.    Lab results reviewed with TS. He advised they are WNL and advised that patient follow up in near future for physical.    Patient notified of lab results and physical in clinic as well.    He verbalized understanding and agreed with plan.    Poly Sinclair, BS, RN, N  [facility]) [phone]

## 2018-05-04 NOTE — TELEPHONE ENCOUNTER
Reason for Call:  Other call back    Detailed comments: Pt calling for labs results also has a very sore throat, sinus,  Wondering what to do?    Phone Number Patient can be reached at: Home number on file [phone] (home)    Best Time: yes     Can we leave a detailed message on this number? YES    Call taken on 5/4/2018 at 8:16 AM by Marbella Toney

## 2018-05-04 NOTE — TELEPHONE ENCOUNTER
Labs results from 04/27/2018 have not been reviewed yet - will forward to MD ANA to review/advise after patient has been triaged    Attempt #1  Called # below - Left a non-detailed message to call back and speak with any triage nurse.    Racquel Sánchez RN  [place] [facility]

## 2019-09-27 ENCOUNTER — HEALTH MAINTENANCE LETTER (OUTPATIENT)
Age: 54
End: 2019-09-27

## 2020-01-21 ENCOUNTER — OFFICE VISIT (OUTPATIENT)
Dept: FAMILY MEDICINE | Facility: CLINIC | Age: 55
End: 2020-01-21
Payer: COMMERCIAL

## 2020-01-21 VITALS
WEIGHT: 233 LBS | HEIGHT: 77 IN | BODY MASS INDEX: 27.51 KG/M2 | HEART RATE: 89 BPM | DIASTOLIC BLOOD PRESSURE: 88 MMHG | TEMPERATURE: 98.2 F | OXYGEN SATURATION: 98 % | SYSTOLIC BLOOD PRESSURE: 126 MMHG

## 2020-01-21 DIAGNOSIS — F90.2 ADHD (ATTENTION DEFICIT HYPERACTIVITY DISORDER), COMBINED TYPE: ICD-10-CM

## 2020-01-21 DIAGNOSIS — A63.0 GENITAL WARTS: ICD-10-CM

## 2020-01-21 DIAGNOSIS — Z23 ENCOUNTER FOR IMMUNIZATION: ICD-10-CM

## 2020-01-21 DIAGNOSIS — L57.0 ACTINIC KERATOSIS: ICD-10-CM

## 2020-01-21 DIAGNOSIS — R21 RASH: ICD-10-CM

## 2020-01-21 DIAGNOSIS — N52.9 ERECTILE DYSFUNCTION, UNSPECIFIED ERECTILE DYSFUNCTION TYPE: ICD-10-CM

## 2020-01-21 DIAGNOSIS — R09.81 NASAL CONGESTION: ICD-10-CM

## 2020-01-21 DIAGNOSIS — Z00.00 ROUTINE GENERAL MEDICAL EXAMINATION AT A HEALTH CARE FACILITY: Primary | ICD-10-CM

## 2020-01-21 DIAGNOSIS — J33.9 NASAL POLYP: ICD-10-CM

## 2020-01-21 PROCEDURE — 17110 DESTRUCTION B9 LES UP TO 14: CPT | Performed by: FAMILY MEDICINE

## 2020-01-21 PROCEDURE — 99214 OFFICE O/P EST MOD 30 MIN: CPT | Mod: 25 | Performed by: FAMILY MEDICINE

## 2020-01-21 PROCEDURE — 99396 PREV VISIT EST AGE 40-64: CPT | Mod: 25 | Performed by: FAMILY MEDICINE

## 2020-01-21 PROCEDURE — 17000 DESTRUCT PREMALG LESION: CPT | Mod: 59 | Performed by: FAMILY MEDICINE

## 2020-01-21 PROCEDURE — 90714 TD VACC NO PRESV 7 YRS+ IM: CPT | Performed by: FAMILY MEDICINE

## 2020-01-21 PROCEDURE — 54056 CRYOSURGERY PENIS LESION(S): CPT | Performed by: FAMILY MEDICINE

## 2020-01-21 PROCEDURE — 90471 IMMUNIZATION ADMIN: CPT | Performed by: FAMILY MEDICINE

## 2020-01-21 RX ORDER — SILDENAFIL 50 MG/1
50 TABLET, FILM COATED ORAL DAILY PRN
Qty: 30 TABLET | Refills: 11 | Status: SHIPPED | OUTPATIENT
Start: 2020-01-21 | End: 2021-04-01

## 2020-01-21 RX ORDER — FLUTICASONE PROPIONATE 50 MCG
1-2 SPRAY, SUSPENSION (ML) NASAL DAILY
Qty: 16 G | Refills: 11 | Status: SHIPPED | OUTPATIENT
Start: 2020-01-21 | End: 2020-02-21

## 2020-01-21 RX ORDER — DEXTROAMPHETAMINE SACCHARATE, AMPHETAMINE ASPARTATE, DEXTROAMPHETAMINE SULFATE AND AMPHETAMINE SULFATE 2.5; 2.5; 2.5; 2.5 MG/1; MG/1; MG/1; MG/1
10-20 TABLET ORAL 2 TIMES DAILY
Qty: 60 TABLET | Refills: 0 | Status: SHIPPED | OUTPATIENT
Start: 2020-01-21 | End: 2020-01-21

## 2020-01-21 RX ORDER — DEXTROAMPHETAMINE SACCHARATE, AMPHETAMINE ASPARTATE, DEXTROAMPHETAMINE SULFATE AND AMPHETAMINE SULFATE 5; 5; 5; 5 MG/1; MG/1; MG/1; MG/1
10-20 TABLET ORAL 2 TIMES DAILY
Qty: 60 TABLET | Refills: 0 | Status: SHIPPED | OUTPATIENT
Start: 2020-01-21 | End: 2020-07-01

## 2020-01-21 RX ORDER — TRIAMCINOLONE ACETONIDE 5 MG/G
CREAM TOPICAL
Qty: 120 G | Refills: 11 | Status: SHIPPED | OUTPATIENT
Start: 2020-01-21 | End: 2020-02-21

## 2020-01-21 ASSESSMENT — MIFFLIN-ST. JEOR: SCORE: 2006.32

## 2020-01-21 NOTE — PROGRESS NOTES
3  SUBJECTIVE:   CC: Herman Joseph is an 54 year old male who presents for preventive health visit.     Healthy Habits:    Do you get at least three servings of calcium containing foods daily (dairy, green leafy vegetables, etc.)? yes    Amount of exercise or daily activities, outside of work: 2 day(s) per week    Problems taking medications regularly No    Medication side effects: No    Have you had an eye exam in the past two years? no    Do you see a dentist twice per year? yes    Do you have sleep apnea, excessive snoring or daytime drowsiness?daytime drowsiness    ADHD  He did neuropsychology testing years ago. He is scattered. He cannot focus at work. He has never been medicated. He would like to get on medication.     Rash on leg - right > left    He would like a refill of cream.     Nasal Polyps/Congestion  He uses Flonase year round.     Erectile Dysfunction  He would like a refill of medication. Symptoms are always there.     Genital Wart   A few years onset. Two on pubic region. One wart on anus.    Today's PHQ-2 Score:   PHQ-2 ( 1999 Pfizer) 1/21/2020 4/29/2016   Q1: Little interest or pleasure in doing things 0 0   Q2: Feeling down, depressed or hopeless 0 0   PHQ-2 Score 0 0     Abuse: Current or Past(Physical, Sexual or Emotional)- No  Do you feel safe in your environment? Yes    Social History     Tobacco Use     Smoking status: Never Smoker     Smokeless tobacco: Never Used   Substance Use Topics     Alcohol use: Yes     Alcohol/week: 3.0 - 4.0 standard drinks     Types: 3 - 4 Standard drinks or equivalent per week     Comment: 6 per month avg     If you drink alcohol do you typically have >3 drinks per day or >7 drinks per week? No                      Last PSA:   PSA   Date Value Ref Range Status   03/20/2017 0.28 0 - 4 ug/L Final     Comment:     Assay Method:  Chemiluminescence using Siemens Vista analyzer       Reviewed orders with patient. Reviewed health maintenance and updated orders  "accordingly - Yes    Reviewed and updated as needed this visit by clinical staff  Tobacco  Allergies  Meds  Problems  Med Hx  Surg Hx  Fam Hx  Soc Hx          Reviewed and updated as needed this visit by Provider  Tobacco  Allergies  Meds  Problems  Med Hx  Surg Hx  Fam Hx        Zoster vaccine and flu shot declined today. He will get a tetanus shot today.     ROS:  Constitutional, HEENT, cardiovascular, pulmonary, GI, , musculoskeletal, neuro, skin, endocrine and psych systems are negative, except as otherwise noted.    This document serves as a record of the services and decisions personally performed and made by Lanre Mendoza MD. It was created on his behalf by Dennis Patrick, a trained medical scribe. The creation of this document is based the provider's statements to the medical scribe.  Scribe Dennis Patrick 2:31 PM, January 21, 2020    OBJECTIVE:   /88   Pulse 89   Temp 98.2  F (36.8  C) (Oral)   Ht 1.943 m (6' 4.5\")   Wt 105.7 kg (233 lb)   SpO2 98%   BMI 27.99 kg/m    EXAM:  GENERAL: healthy, alert and no distress  EYES: Eyes grossly normal to inspection, PERRL and conjunctivae and sclerae normal  HENT: ear canals and TM's normal, nose and mouth without ulcers or lesions  NECK: no adenopathy, no asymmetry, masses, or scars and thyroid normal to palpation  RESP: lungs clear to auscultation - no rales, rhonchi or wheezes  BREAST: normal without masses, tenderness or nipple discharge and no palpable axillary masses or adenopathy  CV: regular rate and rhythm, normal S1 S2, no S3 or S4, no murmur, click or rub, no peripheral edema and peripheral pulses strong  ABDOMEN: soft, nontender, no hepatosplenomegaly, no masses and bowel sounds normal   (male): two genital warts, otherwise, normal male genitalia without lesions or urethral discharge, no hernia  RECTAL: one wart on anus, otherwise, normal sphincter tone, no rectal masses, prostate normal size, smooth, nontender without nodules or " masses  MS: no gross musculoskeletal defects noted, no edema  SKIN: tan patch on left temple area - actinic keratosis, otherwise, no suspicious lesions or rashes  NEURO: Normal strength and tone, mentation intact and speech normal  PSYCH: mentation appears normal, affect normal/bright  LYMPH: no cervical, supraclavicular, axillary, or inguinal adenopathy    See Adult ADHD wendler scale = 56  ASSESSMENT/PLAN:   Herman was seen today for physical.    Diagnoses and all orders for this visit:    Routine general medical examination at a health care facility    ADHD (attention deficit hyperactivity disorder), combined type - new diagnosis - symptoms all of his lifeattention and concentration worse lately. He would like to try medication for work. Start:  -     amphetamine-dextroamphetamine (ADDERALL) 20 MG tablet; Take 0.5-1 tablets (10-20 mg) by mouth 2 times daily  -     OFFICE/OUTPT VISIT,EST,LEVL IV    Rash - controlled - continue medication.  -     triamcinolone (ARISTOCORT HP) 0.5 % external cream; Apply sparingly to affected area three times daily.    Nasal congestion - controlled - continue medication.  -     fluticasone (FLONASE) 50 MCG/ACT nasal spray; Spray 1-2 sprays into both nostrils daily    Nasal polyp - patient wants to get them removed.     Erectile dysfunction, unspecified erectile dysfunction type - controlled - continue medication.  -     sildenafil (VIAGRA) 50 MG tablet; Take 1 tablet (50 mg) by mouth daily as needed (erectile dysfunction) 30 min to 4 hrs before sex. Do not use with nitroglycerin, terazosin or doxazosin.    Genital warts / Actinic keratosis - lesions on the left temple area, pubic area, and anus were treated today. See procedure note.   -     DESTRUCT BENIGN LESION, UP TO 14    Procedure Note:  Liquid nitrogen was applied for 5-10 seconds x3  to the skin lesions on the left temple, pubic area, and anus and the expected blistering or scabbing reaction explained. Do not pick at the  "areas. Patient reminded to expect hypopigmented scars from the procedure. Return if lesions fail to fully resolve.    Encounter for immunization  -     VACCINE ADMINISTRATION, INITIAL  -     TD (ADULT, 7+) PRESERVE FREE    COUNSELING:  Reviewed preventive health counseling, as reflected in patient instructions    BP Readings from Last 1 Encounters:   01/21/20 126/88     Estimated body mass index is 27.99 kg/m  as calculated from the following:    Height as of this encounter: 1.943 m (6' 4.5\").    Weight as of this encounter: 105.7 kg (233 lb).    BP Screening:   Last 3 BP Readings:    BP Readings from Last 3 Encounters:   01/21/20 126/88   04/27/18 146/84   04/23/18 116/64     The following was recommended to the patient:  Re-screen BP within a year and recommended lifestyle modifications  Weight management plan: Discussed healthy diet and exercise guidelines     reports that he has never smoked. He has never used smokeless tobacco.    Counseling Resources:  ATP IV Guidelines  Pooled Cohorts Equation Calculator  FRAX Risk Assessment  ICSI Preventive Guidelines  Dietary Guidelines for Americans, 2010  USDA's MyPlate  ASA Prophylaxis  Lung CA Screening    The information in this document, created by the medical scribe for me, accurately reflects the services I personally performed and the decisions made by me. I have reviewed and approved this document for accuracy prior to leaving the patient care area.  3:23 PM, 01/21/20    Lanre Mendoza MD  [facility] [place]    "

## 2020-01-21 NOTE — LETTER
[facility]   - 61 questions answered by the adult patient recalling his/her childhood behavior   - 5 possible responses scored from 0 to 4 points     As a child I was (or had):   Not at all     or very    slightly mildly Moder-  ately  quite    a bit   very much   1 Active restless always on the go 0      1 2 3 4   2 Afraid of things 0 1 2 3 4   3 Concentration problems  Easily distracted 0 1 2 3 4   4 Anxious worrying 0 1 2 3 4   5 Nervous fidgety 0 1 2 3 4   6 Inattentive daydreaming 0 1 2 3 4   7 Hot-or short-tempered low boiling point 0 1 2 3 4   8 Shy sensitive 0 1 2 3 4   9 Temper outbursts tantrums 0 1 2 3 4   10 Trouble with stick-to-it-iveness not follow-  Ing through.  Failing to finish things started 0 1 2 3 4   11 Stubborn strong-willed 0 1 2 3 4   12 Sad or blue depressed unhappy 0 1 2 3 4   13 Incautious.  Franklin-devilish involved in   pranks 0 1 2 3 4   14 Not getting a kick out of things dissatis-  fied with life 0 1 2 3 4   15 Disobedient with parents 0 1 2 3 4   16 Low opinion of myself 0 1 2 3 4   17 irritable 0 1 2 3 4   18 Outgoing friendly enjoyed company of people 0 1 2 3 4   19 Sloppy disorganized 0 1 2 3 4   20 Venegas ups and downs 0 1 2 3 4   21 angry 0 1 2 3 4   22 Friends popular 0 1 2 3 4   23 Well-organized tidy neat 0 1 2 3 4   24 Acting without thinking impulsive 0 1 2 3 4   25 Tendency to be immature 0 1 2 3 4   26 Guilty feelings regretful 0 1 2 3 4   27 Losing control of myself 0 1 2 3 4   28 Tendency to be or act irrational 0 1 2 3 4   29 Unpopular with other children didn't keep friends for long didn't get along with other children 0 1 2 3 4   30 Poorly coordinated did not participate in sports 0 1 2 3 4   31 Afraid of losing control of self 0 1 2 3 4   32 Well-coordinated picked first in games 0 1 2 3 4   33 Tomboyish (for women only) 0 1 2 3 4   34 Running away from home 0 1 2 3 4   35 Getting into fights 0 1 2 3 4   36 Teasing other children 0 1 2 3 4   37 Leader  bossy 0 1 2 3 4   38 Difficulty getting awake 0 1 2 3 4   39 Follower led around too much 0 1 2 3 4   40 Trouble seeing things from someone else's point of view 0 1 2 3 4   41 Trouble with authorities trouble with school visits to the principal's office 0 1 2 3 4   42 Trouble with police booked convicted 0 1 2 3 4    Medical problems as a child     Not at all  Or very  slightly Mildly Moder-  ately Quite  A bit Very  much   43 headaches 0 1 2 3 4   44 stomachaches 0 1 2 3 4   45 constipation 0 1 2 3 4   46 diarrhea 0 1 2 3 4   47 Food allergies 0 1 2 3 4   48 Other allergies 0 1 2 3 4   49 bedwetting 0 1 2 3 4    As a child in school I was (or had)       Not at all  Or very  slightly Mildly Moder-  ately Quite  A bit Very  much   50 Overall a good student fast 0 1 2 3 4   51 Overall a poor student slow learner 0 1 2 3 4   52 Slow in learning to read 0 1 2 3 4   53 Slow reader 0 1 2 3 4   54 Trouble reversing letters 0 1 2 3 4   55 Problems with spelling 0 1 2 3 4   56 Trouble with mathematics or numbers 0 1 2 3 4   57 Bad handwriting 0 1 2 3 4   58 Able to read pretty well but never really enjoyed reading 0 1 2 3 4   59 Not achieving up to potential 0 1 2 3 4   60 Repeating grades 0 1 2 3 4   61 Suspended or expelled 0 1 2 3 4                              [place] Adult ADD/ADHD Rating Scale  Questions Associated with ADHD    - 25 of the questions were associated with ADHD as follows:     As a child I was (or had):   3 Concentration problems easily distracted   4 Anxious worrying   5 Nervous fidgety   6 Inattentive daydreaming   7 Hot-or short-tempered low boiling point   9 Temper outbursts tantrums   10 Trouble with stick-to-it-iveness not following through.  Failing to finish things  started   11 Stubborn strong-willed   12 Sad or blue depressed unhappy   15 Disobedient with parents rebellious sassy   16 Low opinion of myself   17 irritable   20 Venegas ups and downs   21 angry   24 Acting without thinhking impulsive    25 Tendency to be immature   26 Guilty feelings regretful   27 Losing control of myself   28 Tendency to be or act irrational   29 Unpopular with other children didn't keep friends for long didn't get along with other children   40 Trouble seeing things from someone else's point of view   41 Trouble with authorities trouble with school visits to principal's office    As a child in school I was (or had)     51 Overall a poor student slow learner   56 Trouble with mathematics or numbers   59 Not achieving up to potential     [place] rating scale subscore= ____________ (sum of 25 questions associated with ADHD).    Interpretation:  - minimum score for the 25 questions is 0  - maximum score 100  - if a cutoff score of 46 was used 86 patients with ADHD 99 of normal persons and     81% of depressed subjects were correctly classified    References:  Elliot MONTANO.   The [place] Rating Scale:  An aid in the          retrospective diagnosis of childhood Attention Deficit Hyperactivity Disorder.  Am J      Psychiatry.  1993; 150:  [phone].

## 2020-01-22 ENCOUNTER — TELEPHONE (OUTPATIENT)
Dept: FAMILY MEDICINE | Facility: CLINIC | Age: 55
End: 2020-01-22

## 2020-01-22 DIAGNOSIS — Z00.00 ROUTINE GENERAL MEDICAL EXAMINATION AT A HEALTH CARE FACILITY: Primary | ICD-10-CM

## 2020-01-22 NOTE — TELEPHONE ENCOUNTER
This patient is coming in for a lab only appointment on 1/24/2020 and need future labs ordered.    Next 5 appointments (look out 90 days)    Feb 21, 2020  2:40 PM CST  [facility] Visit [facility] [address] [place] [id number] [phone]

## 2020-02-21 ENCOUNTER — OFFICE VISIT (OUTPATIENT)
Dept: FAMILY MEDICINE | Facility: CLINIC | Age: 55
End: 2020-02-21
Payer: COMMERCIAL

## 2020-02-21 VITALS
BODY MASS INDEX: 27.16 KG/M2 | OXYGEN SATURATION: 99 % | WEIGHT: 230 LBS | DIASTOLIC BLOOD PRESSURE: 82 MMHG | TEMPERATURE: 98.1 F | HEIGHT: 77 IN | HEART RATE: 82 BPM | SYSTOLIC BLOOD PRESSURE: 112 MMHG

## 2020-02-21 DIAGNOSIS — R09.81 NASAL CONGESTION: ICD-10-CM

## 2020-02-21 DIAGNOSIS — F90.2 ADHD (ATTENTION DEFICIT HYPERACTIVITY DISORDER), COMBINED TYPE: Primary | ICD-10-CM

## 2020-02-21 DIAGNOSIS — R21 RASH: ICD-10-CM

## 2020-02-21 PROCEDURE — 99214 OFFICE O/P EST MOD 30 MIN: CPT | Performed by: FAMILY MEDICINE

## 2020-02-21 RX ORDER — DEXTROAMPHETAMINE SACCHARATE, AMPHETAMINE ASPARTATE, DEXTROAMPHETAMINE SULFATE AND AMPHETAMINE SULFATE 5; 5; 5; 5 MG/1; MG/1; MG/1; MG/1
10-20 TABLET ORAL 2 TIMES DAILY
Qty: 60 TABLET | Refills: 0 | Status: SHIPPED | OUTPATIENT
Start: 2020-04-23 | End: 2020-02-21

## 2020-02-21 RX ORDER — DEXTROAMPHETAMINE SACCHARATE, AMPHETAMINE ASPARTATE, DEXTROAMPHETAMINE SULFATE AND AMPHETAMINE SULFATE 5; 5; 5; 5 MG/1; MG/1; MG/1; MG/1
10-20 TABLET ORAL 2 TIMES DAILY
Qty: 60 TABLET | Refills: 0 | Status: SHIPPED | OUTPATIENT
Start: 2020-02-21 | End: 2020-02-21

## 2020-02-21 RX ORDER — TRIAMCINOLONE ACETONIDE 5 MG/G
CREAM TOPICAL
Qty: 120 G | Refills: 11 | Status: SHIPPED | OUTPATIENT
Start: 2020-02-21 | End: 2022-01-06 | Stop reason: ALTCHOICE

## 2020-02-21 RX ORDER — DEXTROAMPHETAMINE SACCHARATE, AMPHETAMINE ASPARTATE, DEXTROAMPHETAMINE SULFATE AND AMPHETAMINE SULFATE 5; 5; 5; 5 MG/1; MG/1; MG/1; MG/1
10-20 TABLET ORAL 2 TIMES DAILY
Qty: 60 TABLET | Refills: 0 | Status: SHIPPED | OUTPATIENT
Start: 2020-04-23 | End: 2021-04-01

## 2020-02-21 RX ORDER — DEXTROAMPHETAMINE SACCHARATE, AMPHETAMINE ASPARTATE, DEXTROAMPHETAMINE SULFATE AND AMPHETAMINE SULFATE 5; 5; 5; 5 MG/1; MG/1; MG/1; MG/1
10-20 TABLET ORAL 2 TIMES DAILY
Qty: 60 TABLET | Refills: 0 | Status: SHIPPED | OUTPATIENT
Start: 2020-03-23 | End: 2020-02-21

## 2020-02-21 RX ORDER — DEXTROAMPHETAMINE SACCHARATE, AMPHETAMINE ASPARTATE, DEXTROAMPHETAMINE SULFATE AND AMPHETAMINE SULFATE 5; 5; 5; 5 MG/1; MG/1; MG/1; MG/1
10-20 TABLET ORAL 2 TIMES DAILY
Qty: 60 TABLET | Refills: 0 | Status: SHIPPED | OUTPATIENT
Start: 2020-03-23 | End: 2021-04-01

## 2020-02-21 RX ORDER — DEXTROAMPHETAMINE SACCHARATE, AMPHETAMINE ASPARTATE, DEXTROAMPHETAMINE SULFATE AND AMPHETAMINE SULFATE 5; 5; 5; 5 MG/1; MG/1; MG/1; MG/1
10-20 TABLET ORAL 2 TIMES DAILY
Qty: 60 TABLET | Refills: 0 | Status: SHIPPED | OUTPATIENT
Start: 2020-02-21 | End: 2021-04-01

## 2020-02-21 RX ORDER — FLUTICASONE PROPIONATE 50 MCG
1-2 SPRAY, SUSPENSION (ML) NASAL DAILY
Qty: 16 G | Refills: 11 | Status: SHIPPED | OUTPATIENT
Start: 2020-02-21 | End: 2022-01-06

## 2020-02-21 ASSESSMENT — MIFFLIN-ST. JEOR: SCORE: 1987.71

## 2020-02-21 NOTE — LETTER
[facility] [place]  02/21/20    Patient: Herman Joseph  YOB: 1965  Medical Record Number: [id number]  CSN: [id number]                                                                              Non-opioid Controlled Substance Agreement    I understand that my care provider has prescribed a controlled substance to help manage my condition(s). I am taking this medicine to help me function or work. I know this is strong medicine, and that it can cause serious side effects. Controlled substances can be sedating, addicting and may cause a dependency on the drug. They can affect my ability to drive or think, and cause depression. They need to be taken exactly as prescribed. Combining controlled substances with certain medicines or chemicals (such as cocaine, sedatives and tranquilizers, sleeping pills, meth) can be dangerous or even fatal. Also, if I stop controlled substances suddenly, I may have severe withdrawal symptoms.  If not helpful, I may be asked to stop them.    The risks, benefits, and side effects of these medicine(s) were explained to me. I agree that:    1. I will take part in other treatments as advised by my care team. This may be psychiatry or counseling, physical therapy, behavioral therapy, group treatment or a referral to a pain clinic. I will reduce or stop my medicine when my care team tells me to do so.  2. I will take my medicines as prescribed. I will not change the dose or schedule unless my care team tells me to. There will be no refills if I  run out early.   I may be contactedwithout warning and asked to complete a urine drug test or pill count at any time.   3. I will keep all my appointments, and understand this is part of the monitoring of controlled substances. My care team may require an office visit for EVERY controlled substance refill. If I miss appointments or don t follow instructions, my care team may stop my medicine.  4. I will not ask other providers to  prescribe controlled substances, and I will not accept controlled substances from other people. If I need another prescribed controlled substance for a new reason, I will tell my care team within 1 business day.  5. I will use one pharmacy to fill all of my controlled substance prescriptions, and it is up to me to make sure that I do not run out of my medicines on weekends or holidays. If my care team is willing to refill my controlled substance prescription without a visit, I must request refills only during office hours, refills may take up to 3 days to process, and it may take up to 5 to 7 days for my medicine to be mailed and ready at my pharmacy. Prescriptions will not be mailed anywhere except my pharmacy.    6. I am responsible for my prescriptions. If the medicine/prescription is lost or stolen, it will not be replaced. I also agree not to share controlled substance medicines with anyone.              [facility] [place]  02/21/20  Patient:  Herman Joseph  YOB: 1965  Medical Record Number: [id number]  [facility]: [id number]    7. I agree to not use ANY illegal or recreational drugs. This includes marijuana, cocaine, bath salts or other drugs. I agree not to use alcohol unless my care team says I may. I agree to give urine samples whenever asked. If I don t give a urine sample, the care team may stop my medicine.    8. If I enroll in the [place] Medical Marijuana program, I will tell my care team. I will also sign an agreement to share my medical records with my care team.    9. I will bring in my list of medicines (or my medicine bottles) each time I come to the clinic.   10. I will tell my care team right away if I become pregnant or have a new medical problem treated outside of my regular clinic.  11. I understand that this medicine can affect my thinking and judgment. It may be unsafe for me to drive, use machinery and do dangerous tasks. I will not do any of these things until I know how  the medicine affects me. If my dose changes, I will wait to see how it affects me. I will contact my care team if I have concerns about medicine side effects.    I understand that if I do not follow any of the conditions above, my prescriptions or treatment may be stopped.      I agree that my provider, clinic care team, and pharmacy may work with any city, state or federal law enforcement agency that investigates the misuse, sale, or other diversion of my controlled medicine. I will allow my provider to discuss my care with or share a copy of this agreement with any other treating provider, pharmacy or emergency room where I receive care. I agree to give up (waive) any right of privacy or confidentiality with respect to these consents.   I have read this agreement and have asked questions about anything I did not understand.    ____________________________________________________    ____________  ________  Patient signature                                                         Date      Time    ____________________________________________________     ____________  ________  Witness                                                          Date      Time    ____________________________________________________  Provider signature

## 2020-02-21 NOTE — PROGRESS NOTES
"Subjective   Herman Joseph is a 55 year old male who presents to clinic today for the following health issues:    HPI   Medication Followup of ADHD/ Adderall    Taking Medication as prescribed: yes    Side Effects:  None    Medication Helping Symptoms:  yes   He took half a pill in the morning and half at lunch. Concentration is better. It lasts for about 3-4 hours. He is sleeping well at night.     Palpitations   Heart races occasionally when is he eating. It has been happening for years but has been worse the last couple of months. He drinks caffeine.     Reviewed and updated as needed this visit by provider:         Review of Systems   Constitutional, HEENT, cardiovascular, pulmonary, GI, , musculoskeletal, neuro, skin, endocrine and psych systems are negative, except as otherwise noted.    This document serves as a record of the services and decisions personally performed and made by Lanre Mendoza MD. It was created on his behalf by Dennis Patrick, a trained medical scribe. The creation of this document is based the provider's statements to the medical scribe.  Scribe Dennis Patrick 3:17 PM, February 21, 2020        Objective   /82   Pulse 82   Temp 98.1  F (36.7  C) (Oral)   Ht 1.943 m (6' 4.5\")   Wt 104.3 kg (230 lb)   SpO2 99%   BMI 27.63 kg/m   Body mass index is 27.63 kg/m .  Physical Exam   GENERAL: healthy, alert, well nourished, well hydrated, no distress  EYES: Eyes grossly normal to inspection, extraocular movements - intact, and PERRL  HENT: ear canals- normal; TMs- normal; Nose- normal; Mouth- no ulcers, no lesions  NECK: no tenderness, no adenopathy, no asymmetry, no masses, no stiffness; thyroid- normal to palpation  RESP: lungs clear to auscultation - no rales, no rhonchi, no wheezes  CV: regular rates and rhythm, normal S1 S2, no S3 or S4 and no murmur, no click or rub -  ABDOMEN: soft, no tenderness, no  hepatosplenomegaly, no masses, normal bowel sounds  MS: extremities- no gross " deformities noted, no edema  NEURO: strength and tone- normal, sensory exam- grossly normal, mentation- intact, speech- normal, reflexes- symmetric  PSYCH: Alert and oriented times 3; speech- coherent , normal rate and volume; able to articulate logical thoughts, able to abstract reason, no tangential thoughts, no hallucinations or delusions, affect- normal  LYMPHATICS: ant. cervical- normal, post. cervical- normal, axillary- normal, supraclavicular- normal, inguinal- normal        Assessment & Plan   Herman was seen today for recheck medication.    Diagnoses and all orders for this visit:    ADHD (attention deficit hyperactivity disorder), combined type - CSA completed 2/21/2020 - every 6 month med checks - medication is improving symptoms.   -     amphetamine-dextroamphetamine 20 MG PO tablet; Take 0.5-1 tablets (10-20 mg) by mouth 2 times daily    Nasal congestion - controlled - continue medication.  -     fluticasone (FLONASE) 50 MCG/ACT NA nasal spray; Spray 1-2 sprays into both nostrils daily    Rash - controlled - continue medication.  -     triamcinolone 0.5 % EX external cream; Apply sparingly to affected area three times daily.    See Patient Instructions    Return in about 6 months (around 8/21/2020), or if symptoms worsen or fail to improve, for Medication Check.    The information in this document, created by the medical scribe for me, accurately reflects the services I personally performed and the decisions made by me. I have reviewed and approved this document for accuracy prior to leaving the patient care area.  3:35PM, 02/21/20        Ricardo Mendoza MD      [address] [address]  [email or web address]   Office: [phone]  Pager: [phone]

## 2020-03-05 ENCOUNTER — TELEPHONE (OUTPATIENT)
Dept: FAMILY MEDICINE | Facility: CLINIC | Age: 55
End: 2020-03-05

## 2020-03-05 NOTE — TELEPHONE ENCOUNTER
Reason for Call:  Herman's girlfriend was dx with Influenza A yesterday. They told him he could call his PCP to check into getting Tamiflu for himself.    He said he has had chills the last couple of days.    Please call to triage.    Best phone number to reach pt at is: [phone]  Ok to leave a message with medical info? yes    Pharmacy preferred (if calling for a refill): [facility]  Patient Representative

## 2020-03-06 NOTE — TELEPHONE ENCOUNTER
Influenza-Like Illness (GEETHA) Protocol    Hermna Joseph      Age: 55 year old     YOB: 1965    Please select the type of triage protocol you used for this patient? A protocol book was used.      Adult Clinical Evaluation     Is this patient experiencing ANY of the following?  Severe lethargy or floppiness No   Struggling to breathe even while inactive or resting No   Unable to stay alert and awake No   Unconsciousness No   Blue or dusky lips, skin, or nail beds No   Seizures No   Completely unable to swallow No     Is this patient experiencing the following?  Patient age is less than 6 weeks No   Inconsolable crying No   Passing little or no urine for 12 hours No   New wheezing or wheezing unresponsive to usual wheezing medications No   Fever > 104 degrees or shaking chills No   Unable or refusing to move neck No   Extremely dry mouth No   Seizure which just occurred but now stopped No   Dizzy when standing or sitting No   Flu-like symptoms previously but have returned and are worse No     Is this patient experiencing the following?  A cough Yes   A sore throat Yes   Muscle/ body aches Yes   Headaches Yes   Fatigue (tiredness) Yes   Fever >100, feels very warm, or has shaking chills Yes     Nursing Plan    Provided home care instructions    General home care instruction:    Avoid contact with people in your household who are at increased risk for more severe complications of influenza (such as pregnant women or people who have a chronic health condition, for example diabetes, heart disease, asthma, or emphysema)    Stay home from school, childcare or other public places until your fever (37.8 degrees Celsius [100 degrees Fahrenheit]) has been gone for at least 24 hours, except to seek medical care. (Fever should be gone without the use of fever-reducing medications.) Use a surgical mask if available, or cover your mouth and nose with a tissue if possible if you need to seek medical care. Contact your  school or  as they may have longer exclusion times.    You may continue to shed virus after your fever is gone. Limit your contact with high-risk individuals for 10 days after your symptoms started and be especially careful to cover your coughs/sneezes and wash your hands.    Cover your cough and wash your hands often, and especially after coughing, sneezing, blowing your nose.    Drink plenty of fluids (such as water, broth, sports drinks, electrolyte beverages for children) to prevent dehydration.    Avoid tobacco and second hand smoke.    Get plenty of rest.    Use over-the-counter pain relievers as needed per  instructions.    Do not give aspirin (acetylsalicylic acid) or products that contain aspirin (e.g. bismuth subsalicylate - Pepto Bismol) to children or teenagers 18 years or younger.    Children younger than 4 years of age should not be given over-the-counter cold medications.    A small number of people with influenza do not have fever. If you have respiratory symptoms and are at increased risk for complications of influenza, contact your health care provider to discuss these symptoms.    For parents of infants:    If possible, only family members who are not sick should care for infants.    Wash your hands with soap and water, or an alcohol-based hand rub (if your hands are not visibly soiled) before caring for your infant.    Cover your mouth and nose with a tissue when coughing or sneezing, and clean your hands.    Contact a health care provider to discuss your illness within 1-2 days if the patient is:    A child less than 5 years    Immunocompromised    If further questions/concerns or if new symptoms develop, call your PCP or [facility] as soon as possible.    When to seek medical attention    Call 911 if the patient experiences:    Difficulty breathing or shortness of breath    Severe lethargy or floppiness    Unable to stay alert and awake    Unconsciousness      Blue or dusky lips, skin, or nail beds    Seizures    Completely unable to swallow    Contact your health care provider right away if the patient experiences:    A painful sore throat accompanied by fever persists for more than 48 hours    Ear pain, sinus pain, persistent vomiting and/or diarrhea    Oral temperature greater than 104  Fahrenheit (40  Celsius)    Dehydration (e.g., mouth feeling dry, dizzy when sitting/standing, decreased urine output)    Severe or persistent vomiting; unable to keep fluids down    Improvement in flu-like symptoms (fever and cough or sore throat) but then return of fever and worse cough or sore throat    Not drinking enough fluid    Not waking up or interacting    Irritability in a child such that it does not want to be held    Any other concerns not stated above        Influenza-Like Illness (GEETHA) Standing Order    Has the patient been seen by a primary care provider at an [facility] within the past two years? Yes     Do any of the following exclusions apply to the patient?  Does the patient have a history of CrCl less than or equal to 60 ml/min No   Is the patient taking Probenecid No   Was an Intranasal live attenuated influenza vaccine (LAIV) received by the patient 2 weeks before antiviral medication No   Was an LAIV received 48 hours after administration of influenza antiviral drugs, if planning on obtaining? No     Does this patient have ANY of the above exclusions answered Yes?  No.      Is this patient currently showing symptoms of Influenza like Illness (GEETHA) after close proximity to someone with a verified diagnosis of Influenza, or is this patient not sick but has had known exposure within less than or equal to 48 hours through close proximity with someone that has a verified diagnosis of Influenza?   This patient is currently sick with GEETHA type symptoms     Does this patient have ANY of the following  specialty conditions?  Chemotherapy or radiation within the last 3 months No   Organ or bone marrow transplant No   Metabolic disorder No   HIV patient with CD4 count <200 No     Does this patient have ANY of the above specialty conditions? No     Have the symptoms of GEETHA been present for less than or equal to 48 hours? Yes     Adult Evaluation for Possible Influenza Treatment due to GEETHA Symptoms      Below are conditions which place adults at increased risk for the more severe complications of influenza.    Does this patient have ANY of the following conditions?  Is 65 years of age or older No   Chronic pulmonary disease such as asthma or COPD No   Heart disease (CHF, CAD, anticoagulation d/t arrhytmia, congenital heart anomaly) *HTN alone is excluded No   Kidney disease (renal failure, insufficiency or dialysis) No   Hepatic or Hematologic disorder (e.g. chronic liver disease patient, sickle cell disease) No   Diabetes (Type 1 or Type 2) No   Neurologic and Neurodevelopment Conditions (including disorders of the brain, spinal cord, peripheral nerve, and muscle, such as cerebral palsy, epilepsy (seizure disorders), stroke, intellectual disability, moderate to severe developmental delay, muscular dystrophy, or spinal cord injury) No   Obese with BMI >40 No   Immunosuppression caused by medications such as those taking prednisone in excess of 20mg daily, or caused by HIV/AIDS with CD4 count more than 200 No     Is pregnant, may be pregnant, or is within two weeks after delivery No   Is a resident of a chronic care facility No   Is patient  or [place] native No   Is <19 years old and is receiving long term aspirin- or salicylate-containing medications No     Does this patient have ANY of the above conditions?  No. Recommend a virtual visit such as an [facility] appointment age 1 to 65, or a telephone visit with the provider (LIP).  If virtual visit is not available, consider an in-office visit with provider  based on same or next day access. If virtual visit not available, consider in-office visit with provider based on same day or next day access.     Pt does not qualify for Protocol [place]. Pt advised to start E-Visit. Patient stated an understanding and agreed with plan.    Additional educational resources include:    http://www.[employer].com    http://www.cdc.gov/flu/  Ricky Green RN

## 2020-03-06 NOTE — TELEPHONE ENCOUNTER
Attempt # 1  Called #   Telephone Information:   Mobile [phone]       Left a non detailed VM to call back at [phone] and ask for any available Triage Nurse.    Ricky Green RN   [facility]

## 2020-06-30 DIAGNOSIS — F90.2 ADHD (ATTENTION DEFICIT HYPERACTIVITY DISORDER), COMBINED TYPE: ICD-10-CM

## 2020-06-30 NOTE — TELEPHONE ENCOUNTER
Reason for Call:  Other prescription    Detailed comments: Pt called this afternoon and would like a refill on his adderall ASAP. I informed the pt that medication refills can take 24-72 business hours, but he wanted a nurse to give him a call TONIGHT in regards to a more approximate time. Please give pt a call back when you can. Thank you.    Phone Number Patient can be reached at: Cell number on file:    Telephone Information:   Mobile [phone]   Or work: [phone]    Best Time:     Can we leave a detailed message on this number? YES    Call taken on 6/30/2020 at 4:10 PM by Marie Lopez

## 2020-07-01 RX ORDER — DEXTROAMPHETAMINE SACCHARATE, AMPHETAMINE ASPARTATE, DEXTROAMPHETAMINE SULFATE AND AMPHETAMINE SULFATE 5; 5; 5; 5 MG/1; MG/1; MG/1; MG/1
10-20 TABLET ORAL 2 TIMES DAILY
Qty: 60 TABLET | Refills: 0 | Status: SHIPPED | OUTPATIENT
Start: 2020-07-01 | End: 2021-04-01

## 2020-07-01 NOTE — TELEPHONE ENCOUNTER
RX monitoring program ([place]) reviewed:  reviewed- no concerns on 7/1/20    [place] profile:  [email or web address]    Kandi HOUSE RN  EP Triage

## 2020-07-01 NOTE — TELEPHONE ENCOUNTER
Controlled Substance Refill Request for amphetamine-dextroamphetamine 20 MG PO tablet   Problem List Complete:  Yes    Last Written Prescription Date:  4.23.20  Last Fill Quantity: 60 tablet,   # refills: 0      Last Office Visit with [facility] primary care provider: 2.21.2o    Future Office visit:     Controlled substance agreement:   Encounter-Level CSA:    There are no encounter-level csa.     Patient-Level CSA:    Controlled Substance Agreement - Non - Opioid - Scan on 2/24/2020  3:58 PM: NON-OPIOID CONTROLLED SUBSTANCE AGREEMENT         Last Urine Drug Screen: No results found for: CDAUT, No results found for: COMDAT, No results found for: THC13, PCP13, COC13, MAMP13, OPI13, AMP13, BZO13, TCA13, MTD13, BAR13, OXY13, PPX13, BUP13     Processing:  Fax Rx to listed pharmacy    [email or web address]   checked in past 3 months?  No, route to RN

## 2021-01-09 ENCOUNTER — HEALTH MAINTENANCE LETTER (OUTPATIENT)
Age: 56
End: 2021-01-09

## 2021-03-13 ENCOUNTER — HEALTH MAINTENANCE LETTER (OUTPATIENT)
Age: 56
End: 2021-03-13

## 2021-04-01 DIAGNOSIS — N52.9 ERECTILE DYSFUNCTION, UNSPECIFIED ERECTILE DYSFUNCTION TYPE: ICD-10-CM

## 2021-04-01 DIAGNOSIS — F90.2 ADHD (ATTENTION DEFICIT HYPERACTIVITY DISORDER), COMBINED TYPE: ICD-10-CM

## 2021-04-01 RX ORDER — SILDENAFIL 50 MG/1
50 TABLET, FILM COATED ORAL DAILY PRN
Qty: 30 TABLET | Refills: 11 | Status: SHIPPED | OUTPATIENT
Start: 2021-04-01 | End: 2022-01-06

## 2021-04-01 RX ORDER — DEXTROAMPHETAMINE SACCHARATE, AMPHETAMINE ASPARTATE, DEXTROAMPHETAMINE SULFATE AND AMPHETAMINE SULFATE 5; 5; 5; 5 MG/1; MG/1; MG/1; MG/1
10-20 TABLET ORAL 2 TIMES DAILY
Qty: 60 TABLET | Refills: 0 | Status: SHIPPED | OUTPATIENT
Start: 2021-04-01 | End: 2022-01-06

## 2021-04-01 NOTE — PROGRESS NOTES
Pt stated he is getting new insurance in 1 week. Is willing to schedule last this month.   Next 5 appointments (look out 90 days)    Apr 26, 2021  3:20 PM  PHYSICAL with Lanre Mendoza MD  [facility] ([facility] ) [address] [place] [id number]  [phone]        Controlled Substance Refill Request for   amphetamine-dextroamphetamine (ADDERALL) 20 MG tablet 60 tablet 0 7/1/2020  No   Sig - Route: Take 0.5-1 tablets (10-20 mg) by mouth 2 times daily - Oral       Problem List Complete:  Yes    Last Written Prescription Date:  7/1/20  Last Fill Quantity: 60,   # refills: 0  Last Office Visit with [facility] primary care provider: 2/21/2020      Future Office visit:   Next 5 appointments (look out 90 days)    Apr 26, 2021  3:20 PM  PHYSICAL with Lanre Mendoza MD  [facility] ([facility] ) [address] [place] [id number]  [phone]          Controlled substance agreement:   Encounter-Level CSA:    There are no encounter-level csa.     Patient-Level CSA:    Controlled Substance Agreement - Non - Opioid - Scan on 2/24/2020  3:58 PM: NON-OPIOID CONTROLLED SUBSTANCE AGREEMENT         Last Urine Drug Screen: No results found for: CDAUT, No results found for: COMDAT, No results found for: THC13, PCP13, COC13, MAMP13, OPI13, AMP13, BZO13, TCA13, MTD13, BAR13, OXY13, PPX13, BUP13     Processing:  Rx to be electronically transmitted to pharmacy by provider     [email or web address]   checked in past 3 months?  No, route to RN     Routing refill request to provider for review/approval because:  Drug not on the [facility] refill protocol      ran, no concern. Last fill 10/29/20    Ricky LILLY RN   [facility] Triage

## 2021-09-14 NOTE — TELEPHONE ENCOUNTER
"    [facility] Note        Subjective     Follow-up (4 week follow up - Closed displaced fracture of head of left radius with routine healing  )  DOI: 8/9/2021    HPI    Ellie Valentino is a 6 y.o. female.  Patient returns today for follow-up left Salter-Avendano II radial head fracture.  She is having no complaints or issues.  Denies pain, numbness or tingling.    No reported fever, chills, night sweats or other constitutional symptoms.        Objective      Physical Exam  Pulse 107   Ht 121.9 cm (47.99\")   Wt 21.9 kg (48 lb 3.2 oz)   SpO2 96%   BMI 14.71 kg/m²     Body mass index is 14.71 kg/m².        Ortho Exam  Left elbow  Skin: Grossly intact with any redness, warmth or soft tissue swelling.  Tenderness: No palpable tenderness at the elbow.  Motion: Full range of motion of the elbow without any restrictions or limitations.  No deformity appreciable.  Strength: 5/5 biceps, triceps, wrist flexors, wrist extensors.  Motor/sensory: Grossly intact C5-T1.  Vascular: 2+ radial pulse with brisk capillary refill into each digit.      Imaging/Studies  Ordered left elbow plain films.  Imaging read/interpreted by Dr. Zamora.    Left Elbow X-Ray     Indication: Pain     Views: AP, oblique and Lateral      Comparison: Left elbow 8/17/2021     Findings:  The fracture at the radial head is healed radiographically.  Fracture line is no longer visible.  No bony lesion  Normal soft tissues  Normal joint spaces     Impression: Healed left radial head fracture.      Assessment:  1. Fracture follow-up    2. Closed displaced fracture of head of left radius with routine healing, subsequent encounter        Plan:  1. Left radial head fracture, Salter-Avendano II, stable and healed.  2. Still no impact or ball sports for an additional 3 weeks.  After that if she is still pain-free may resume activities as tolerated.  3. Mother very concerned about possible growth plate abnormality based on location of the " done   fracture.  4. Patient can follow-up in 3 months for repeat evaluation to include preclinic imaging to assess for growth plate abnormality.  If it anytime child refuses to move the elbow, notes increased pain, deformity, etc. she can return to the clinic sooner for evaluation and treatment.  Mother verbalized understanding.  5. Questions and concerns answered.    Case discussed with Dr. Zamora who agrees with the assessment and plan.      Akilah Griffin PA-C  09/17/21  14:42 EDT      Dragon disclaimer:  Much of this encounter note is an electronic transcription/translation of spoken language to printed text. The electronic translation of spoken language may permit erroneous, or at times, nonsensical words or phrases to be inadvertently transcribed; Although I have reviewed the note for such errors, some may still exist.

## 2021-10-23 ENCOUNTER — HEALTH MAINTENANCE LETTER (OUTPATIENT)
Age: 56
End: 2021-10-23

## 2022-01-06 ENCOUNTER — OFFICE VISIT (OUTPATIENT)
Dept: FAMILY MEDICINE | Facility: CLINIC | Age: 57
End: 2022-01-06
Payer: COMMERCIAL

## 2022-01-06 VITALS
HEIGHT: 76 IN | WEIGHT: 245.5 LBS | DIASTOLIC BLOOD PRESSURE: 82 MMHG | RESPIRATION RATE: 16 BRPM | BODY MASS INDEX: 29.9 KG/M2 | SYSTOLIC BLOOD PRESSURE: 132 MMHG | TEMPERATURE: 99.4 F | OXYGEN SATURATION: 98 % | HEART RATE: 76 BPM

## 2022-01-06 DIAGNOSIS — Z13.220 SCREENING FOR LIPOID DISORDERS: ICD-10-CM

## 2022-01-06 DIAGNOSIS — Z00.00 ROUTINE GENERAL MEDICAL EXAMINATION AT A HEALTH CARE FACILITY: Primary | ICD-10-CM

## 2022-01-06 DIAGNOSIS — N52.9 ERECTILE DYSFUNCTION, UNSPECIFIED ERECTILE DYSFUNCTION TYPE: ICD-10-CM

## 2022-01-06 DIAGNOSIS — Z13.1 SCREENING FOR DIABETES MELLITUS: ICD-10-CM

## 2022-01-06 DIAGNOSIS — Z13.0 SCREENING FOR IRON DEFICIENCY ANEMIA: ICD-10-CM

## 2022-01-06 DIAGNOSIS — Z12.5 SCREENING FOR MALIGNANT NEOPLASM OF PROSTATE: ICD-10-CM

## 2022-01-06 DIAGNOSIS — R21 RASH: ICD-10-CM

## 2022-01-06 DIAGNOSIS — F90.2 ADHD (ATTENTION DEFICIT HYPERACTIVITY DISORDER), COMBINED TYPE: ICD-10-CM

## 2022-01-06 DIAGNOSIS — R09.81 NASAL CONGESTION: ICD-10-CM

## 2022-01-06 PROCEDURE — 90682 RIV4 VACC RECOMBINANT DNA IM: CPT | Performed by: FAMILY MEDICINE

## 2022-01-06 PROCEDURE — 96127 BRIEF EMOTIONAL/BEHAV ASSMT: CPT | Performed by: FAMILY MEDICINE

## 2022-01-06 PROCEDURE — 99396 PREV VISIT EST AGE 40-64: CPT | Mod: 25 | Performed by: FAMILY MEDICINE

## 2022-01-06 PROCEDURE — 90471 IMMUNIZATION ADMIN: CPT | Performed by: FAMILY MEDICINE

## 2022-01-06 RX ORDER — SILDENAFIL 50 MG/1
50 TABLET, FILM COATED ORAL DAILY PRN
Qty: 30 TABLET | Refills: 11 | Status: SHIPPED | OUTPATIENT
Start: 2022-01-06 | End: 2023-03-02

## 2022-01-06 RX ORDER — DEXTROAMPHETAMINE SACCHARATE, AMPHETAMINE ASPARTATE, DEXTROAMPHETAMINE SULFATE AND AMPHETAMINE SULFATE 5; 5; 5; 5 MG/1; MG/1; MG/1; MG/1
10-20 TABLET ORAL 2 TIMES DAILY
Qty: 60 TABLET | Refills: 0 | Status: SHIPPED | OUTPATIENT
Start: 2022-01-06 | End: 2022-03-04

## 2022-01-06 RX ORDER — TRIAMCINOLONE ACETONIDE 5 MG/G
CREAM TOPICAL
Qty: 120 G | Refills: 11 | Status: CANCELLED | OUTPATIENT
Start: 2022-01-06

## 2022-01-06 RX ORDER — FLUTICASONE PROPIONATE 50 MCG
1-2 SPRAY, SUSPENSION (ML) NASAL DAILY
Qty: 16 G | Refills: 11 | Status: SHIPPED | OUTPATIENT
Start: 2022-01-06 | End: 2022-04-28

## 2022-01-06 RX ORDER — FLUOCINONIDE 0.5 MG/G
OINTMENT TOPICAL 2 TIMES DAILY
Qty: 60 G | Refills: 3 | Status: SHIPPED | OUTPATIENT
Start: 2022-01-06 | End: 2022-04-28

## 2022-01-06 RX ORDER — FEXOFENADINE HCL 180 MG/1
180 TABLET ORAL DAILY
Qty: 90 TABLET | Refills: 3 | COMMUNITY
Start: 2022-01-06 | End: 2023-03-02

## 2022-01-06 ASSESSMENT — ENCOUNTER SYMPTOMS
FREQUENCY: 1
WEAKNESS: 1
PALPITATIONS: 1

## 2022-01-06 ASSESSMENT — MIFFLIN-ST. JEOR: SCORE: 2049.05

## 2022-01-06 NOTE — PROGRESS NOTES
SUBJECTIVE:   CC: Herman Joseph is an 56 year old male who presents for preventative health visit.       Patient has been advised of split billing requirements and indicates understanding: Yes  Healthy Habits:     Getting at least 3 servings of Calcium per day:  NO    Bi-annual eye exam:  NO    Dental care twice a year:  Yes    Sleep apnea or symptoms of sleep apnea:  Daytime drowsiness    Diet:  Other    Frequency of exercise:  None    Taking medications regularly:  Yes    PHQ-2 Total Score: 0    Additional concerns today:  No    H/o sinus pressure and congestion -  Uses flonase - some dull headaches.     H/o S.A.D. - takes vit D,  Has a SAD    Today's PHQ-2 Score:   PHQ-2 ( 1999 Pfizer) 1/6/2022   Q1: Little interest or pleasure in doing things 0   Q2: Feeling down, depressed or hopeless 0   PHQ-2 Score 0   PHQ-2 Total Score (12-17 Years)- Positive if 3 or more points; Administer PHQ-A if positive -   Q1: Little interest or pleasure in doing things Not at all   Q2: Feeling down, depressed or hopeless Not at all   PHQ-2 Score 0     Abuse: Current or Past(Physical, Sexual or Emotional)- No  Do you feel safe in your environment? Yes    Have you ever done Advance Care Planning? (For example, a Health Directive, POLST, or a discussion with a medical provider or your loved ones about your wishes): No, advance care planning information given to patient to review.  Patient declined advance care planning discussion at this time.    Social History     Tobacco Use     Smoking status: Never Smoker     Smokeless tobacco: Never Used   Substance Use Topics     Alcohol use: Yes     Alcohol/week: 3.0 - 4.0 standard drinks     Types: 3 - 4 Standard drinks or equivalent per week     Comment: 6 per month avg     If you drink alcohol do you typically have >3 drinks per day or >7 drinks per week? No    Alcohol Use 1/6/2022   Prescreen: >3 drinks/day or >7 drinks/week? Yes   Prescreen: >3 drinks/day or >7 drinks/week? -   AUDIT SCORE   3     AUDIT - Alcohol Use Disorders Identification Test - Reproduced from the World Health Organization Audit 2001 (Second Edition) 1/6/2022   1.  How often do you have a drink containing alcohol? 2 to 3 times a week   2.  How many drinks containing alcohol do you have on a typical day when you are drinking? 1 or 2   3.  How often do you have five or more drinks on one occasion? Never   4.  How often during the last year have you found that you were not able to stop drinking once you had started? Never   5.  How often during the last year have you failed to do what was normally expected of you because of drinking? Never   6.  How often during the last year have you needed a first drink in the morning to get yourself going after a heavy drinking session? Never   7.  How often during the last year have you had a feeling of guilt or remorse after drinking? Never   8.  How often during the last year have you been unable to remember what happened the night before because of your drinking? Never   9.  Have you or someone else been injured because of your drinking? No   10. Has a relative, friend, doctor or other health care worker been concerned about your drinking or suggested you cut down? No   TOTAL SCORE 3       Last PSA:   PSA   Date Value Ref Range Status   03/20/2017 0.28 0 - 4 ug/L Final     Comment:     Assay Method:  Chemiluminescence using Siemens Vista analyzer       Reviewed orders with patient. Reviewed health maintenance and updated orders accordingly - Yes      Reviewed and updated as needed this visit by clinical staff  Tobacco  Allergies  Meds  Problems  Med Hx  Surg Hx  Fam Hx         Reviewed and updated as needed this visit by Provider  Tobacco  Allergies  Meds  Problems  Med Hx  Surg Hx  Fam Hx            Review of Systems   Cardiovascular: Positive for chest pain, palpitations and peripheral edema.   Genitourinary: Positive for frequency and impotence.   Skin: Positive for rash.  "  Neurological: Positive for weakness.       OBJECTIVE:   /82 (BP Location: Left arm)   Pulse 76   Temp 99.4  F (37.4  C) (Tympanic)   Resp 16   Ht 1.937 m (6' 4.25\")   Wt 111.4 kg (245 lb 8 oz)   SpO2 98%   BMI 29.69 kg/m    EXAM:  GENERAL: healthy, alert and no distress  EYES: Eyes grossly normal to inspection, PERRL and conjunctivae and sclerae normal  HENT: ear canals and TM's normal, nose and mouth without ulcers or lesions  NECK: no adenopathy, no asymmetry, masses, or scars and thyroid normal to palpation  RESP: lungs clear to auscultation - no rales, rhonchi or wheezes  BREAST: normal without masses, tenderness or nipple discharge and no palpable axillary masses or adenopathy  CV: regular rate and rhythm, normal S1 S2, no S3 or S4, no murmur, click or rub, no peripheral edema and peripheral pulses strong  ABDOMEN: soft, nontender, no hepatosplenomegaly, no masses and bowel sounds normal   (male): normal male genitalia without lesions or urethral discharge, no hernia  MS: no gross musculoskeletal defects noted, no edema  SKIN: no suspicious lesions or rashes  NEURO: Normal strength and tone, mentation intact and speech normal  PSYCH: mentation appears normal, affect normal/bright  LYMPH: no cervical, supraclavicular, axillary, or inguinal adenopathy  RECTAL: right anal condyloma    ASSESSMENT/PLAN:   Routine general medical examination at a health care facility      ADHD (attention deficit hyperactivity disorder), combined type  Controlled - continue medication(s).  - amphetamine-dextroamphetamine (ADDERALL) 20 MG tablet  Dispense: 60 tablet; Refill: 0    Nasal congestion  Stable - continue medication(s).  - fluticasone (FLONASE) 50 MCG/ACT nasal spray  Dispense: 16 g; Refill: 11  - fexofenadine (ALLEGRA ALLERGY) 180 MG tablet  Dispense: 90 tablet; Refill: 3    Rash  Stable - continue medication(s).  - fexofenadine (ALLEGRA ALLERGY) 180 MG tablet  Dispense: 90 tablet; Refill: 3  - fluocinonide " "(LIDEX) 0.05 % external ointment  Dispense: 60 g; Refill: 3    Erectile dysfunction, unspecified erectile dysfunction type  Stable - continue medication(s).  - sildenafil (VIAGRA) 50 MG tablet  Dispense: 30 tablet; Refill: 11    Screening for iron deficiency anemia  - CBC with platelets    Screening for diabetes mellitus  - Comprehensive metabolic panel    Screening for lipoid disorders  - Lipid panel reflex to direct LDL Fasting    Screening for malignant neoplasm of prostate  - Prostate Specific Antigen Screen    COUNSELING:  Reviewed preventive health counseling, as reflected in patient instructions      Estimated body mass index is 29.69 kg/m  as calculated from the following:    Height as of this encounter: 1.937 m (6' 4.25\").    Weight as of this encounter: 111.4 kg (245 lb 8 oz).  Weight management plan: Discussed healthy diet and exercise guidelines     reports that he has never smoked. He has never used smokeless tobacco.      Return in about 1 year (around 1/6/2023) for wellness exam with fasting labs with Ricardo Mendoza MD.         Ricardo Mendoza MD     [address] [address]  [email or web address]     Office: [phone]       "

## 2022-03-02 DIAGNOSIS — F90.2 ADHD (ATTENTION DEFICIT HYPERACTIVITY DISORDER), COMBINED TYPE: ICD-10-CM

## 2022-03-02 NOTE — TELEPHONE ENCOUNTER
Patient is requesting a Rx refill for amphetamine-dextroamphetamine (ADDERALL) 20 MG tablet. Patient wants the best deal for the medication, he states provider did something with a coupon.  Dalia Bhat   [facility]  Central Scheduler

## 2022-03-03 NOTE — TELEPHONE ENCOUNTER
Routing refill request to provider for review/approval because:  Drug not on the [facility] refill protocol     LOV: 1/6/2022    Last filled: 1/6/22       CSA -- Patient Level:    Controlled Substance Agreement - Non - Opioid - Scan on 2/24/2020  3:58 PM: NON-OPIOID CONTROLLED SUBSTANCE AGREEMENT       No drug screen on file    Ricky LILLY RN   [facility]

## 2022-03-04 RX ORDER — DEXTROAMPHETAMINE SACCHARATE, AMPHETAMINE ASPARTATE, DEXTROAMPHETAMINE SULFATE AND AMPHETAMINE SULFATE 5; 5; 5; 5 MG/1; MG/1; MG/1; MG/1
10-20 TABLET ORAL 2 TIMES DAILY
Qty: 60 TABLET | Refills: 0 | Status: SHIPPED | OUTPATIENT
Start: 2022-03-04 | End: 2022-05-26

## 2022-04-28 ENCOUNTER — ANCILLARY PROCEDURE (OUTPATIENT)
Dept: GENERAL RADIOLOGY | Facility: CLINIC | Age: 57
End: 2022-04-28
Attending: FAMILY MEDICINE
Payer: COMMERCIAL

## 2022-04-28 ENCOUNTER — OFFICE VISIT (OUTPATIENT)
Dept: FAMILY MEDICINE | Facility: CLINIC | Age: 57
End: 2022-04-28

## 2022-04-28 VITALS
HEIGHT: 75 IN | BODY MASS INDEX: 32.58 KG/M2 | TEMPERATURE: 98.2 F | OXYGEN SATURATION: 98 % | DIASTOLIC BLOOD PRESSURE: 84 MMHG | WEIGHT: 262 LBS | HEART RATE: 80 BPM | SYSTOLIC BLOOD PRESSURE: 132 MMHG

## 2022-04-28 DIAGNOSIS — R09.81 NASAL CONGESTION: ICD-10-CM

## 2022-04-28 DIAGNOSIS — M25.562 LEFT KNEE PAIN, UNSPECIFIED CHRONICITY: ICD-10-CM

## 2022-04-28 DIAGNOSIS — L02.92 BOIL: ICD-10-CM

## 2022-04-28 DIAGNOSIS — M25.562 LEFT KNEE PAIN, UNSPECIFIED CHRONICITY: Primary | ICD-10-CM

## 2022-04-28 DIAGNOSIS — R21 RASH: ICD-10-CM

## 2022-04-28 DIAGNOSIS — M17.10 PATELLOFEMORAL ARTHRITIS: ICD-10-CM

## 2022-04-28 PROCEDURE — 73562 X-RAY EXAM OF KNEE 3: CPT | Mod: RT | Performed by: RADIOLOGY

## 2022-04-28 PROCEDURE — 99214 OFFICE O/P EST MOD 30 MIN: CPT | Performed by: FAMILY MEDICINE

## 2022-04-28 RX ORDER — FLUTICASONE PROPIONATE 50 MCG
1-2 SPRAY, SUSPENSION (ML) NASAL DAILY
Qty: 16 G | Refills: 11 | Status: SHIPPED | OUTPATIENT
Start: 2022-04-28 | End: 2022-05-26

## 2022-04-28 RX ORDER — FLUOCINONIDE 0.5 MG/G
OINTMENT TOPICAL 2 TIMES DAILY
Qty: 60 G | Refills: 3 | Status: SHIPPED | OUTPATIENT
Start: 2022-04-28 | End: 2022-05-26

## 2022-04-28 RX ORDER — NAPROXEN SODIUM 220 MG
440 TABLET ORAL 2 TIMES DAILY WITH MEALS
Qty: 28 TABLET | Refills: 0 | COMMUNITY
Start: 2022-04-28 | End: 2022-05-05

## 2022-04-28 NOTE — PROGRESS NOTES
"Uric acid    Assessment & Plan   Left knee pain, unspecified chronicity  Patellofemoral arthritis  X-ray showed significant loss of cartilage on the lateral portion of the patella and a knee brace may help as well as doing physical therapy to strengthen the medial quad.  Could consider steroid injections and/or orthopedic referral if symptoms are not improving.  Patient was concerned about Lyme's disease and testing ordered.  - XR Knee Right 3 Views  - Lyme Disease [place] with reflex to WB Serum  - Uric acid  - Physical Therapy Referral  - naproxen sodium (ALEVE) 220 MG tablet  Dispense: 28 tablet; Refill: 0    Rash  Recurrent rash on the distal shin and will refill:  - fluocinonide (LIDEX) 0.05 % external ointment  Dispense: 60 g; Refill: 3    Nasal congestion  Ongoing congestion for years and he would like to meet with ENT  - fluticasone (FLONASE) 50 MCG/ACT nasal spray  Dispense: 16 g; Refill: 11  - Otolaryngology Referral    Boil  Self draining lesion on side of left mid calf and warm packs should help this resolve.  No need for antibiotics at this time.      Return in about 1 month (around 5/28/2022) for symptoms failing to improve or sooner if worsening.      Ricardo Mendoza MD      [address] [address]  [email or web address]   Office: [phone]       Saud Sutton is a 57 year old who presents for the following health issues.    Concern - Knee Problem  Onset: last couple weeks ago  Description: Right side knee pain, swelling in legs     Reason for visit:  Right leg pain  Symptom onset:  3-7 days ago  Symptoms include:  Right  side outside leg pain  Symptom intensity:  Severe  Symptom progression:  Worsening  Had these symptoms before:  No  What makes it worse:  Standing  What makes it better:  Rest    Feels more stiff/achey in his leg joints the lat 5-6 months.     Noted a \"boil\" on the lateral proximal lower leg/calf last week and warm packed.    Some " "increased edema.    He eats 2-3 servings of fruits and vegetables daily.He consumes 3 sweetened beverage(s) daily.He exercises with enough effort to increase his heart rate 9 or less minutes per day.  He exercises with enough effort to increase his heart rate 3 or less days per week.   He is taking medications regularly.       Review of Systems   Constitutional, HEENT, cardiovascular, pulmonary, gi and gu systems are negative, except as otherwise noted.      Objective    /84 (BP Location: Left arm, Patient Position: Sitting, Cuff Size: Adult Large)   Pulse 80   Temp 98.2  F (36.8  C) (Tympanic)   Ht 1.905 m (6' 3\")   Wt 118.8 kg (262 lb)   SpO2 98%   BMI 32.75 kg/m    Body mass index is 32.75 kg/m .  Physical Exam   GENERAL: healthy, alert and no distress  NECK: no adenopathy, no asymmetry, masses, or scars and thyroid normal to palpation  RESP: lungs clear to auscultation - no rales, rhonchi or wheezes  CV: regular rate and rhythm, normal S1 S2, no S3 or S4, no murmur, click or rub, no peripheral edema and peripheral pulses strong  ABDOMEN: soft, nontender, no hepatosplenomegaly, no masses and bowel sounds normal  MS: right knee tender along the lateral patella and lateral jlt  Lig in tact x 4  otherwise  no gross musculoskeletal defects noted, no edema  SKIN: self drained red 5 mm area on the right lateral calf. otherwise no suspicious lesions or rashes  NEURO: Normal strength and tone, mentation intact and speech normal  IMPRESSION: Anatomic alignment right knee. No acute displaced right  knee fracture. Severe lateral aspect patellofemoral compartment right  knee osteoarthritis with marginal spurring. Mild medial and lateral  compartment right knee osteoarthritis. No sizable right knee joint  effusion. Mild knee soft tissue swelling.     Right knee x-ray:  "

## 2022-05-26 ENCOUNTER — TELEPHONE (OUTPATIENT)
Dept: FAMILY MEDICINE | Facility: CLINIC | Age: 57
End: 2022-05-26
Payer: COMMERCIAL

## 2022-05-26 DIAGNOSIS — R09.81 NASAL CONGESTION: ICD-10-CM

## 2022-05-26 DIAGNOSIS — R21 RASH: ICD-10-CM

## 2022-05-26 DIAGNOSIS — F90.2 ADHD (ATTENTION DEFICIT HYPERACTIVITY DISORDER), COMBINED TYPE: ICD-10-CM

## 2022-05-26 RX ORDER — DEXTROAMPHETAMINE SACCHARATE, AMPHETAMINE ASPARTATE, DEXTROAMPHETAMINE SULFATE AND AMPHETAMINE SULFATE 5; 5; 5; 5 MG/1; MG/1; MG/1; MG/1
10-20 TABLET ORAL 2 TIMES DAILY
Qty: 60 TABLET | Refills: 0 | Status: SHIPPED | OUTPATIENT
Start: 2022-05-26 | End: 2022-08-29

## 2022-05-26 RX ORDER — FLUOCINONIDE 0.5 MG/G
OINTMENT TOPICAL 2 TIMES DAILY
Qty: 60 G | Refills: 3 | Status: SHIPPED | OUTPATIENT
Start: 2022-05-26 | End: 2022-08-29

## 2022-05-26 RX ORDER — FLUTICASONE PROPIONATE 50 MCG
1-2 SPRAY, SUSPENSION (ML) NASAL DAILY
Qty: 16 G | Refills: 11 | Status: SHIPPED | OUTPATIENT
Start: 2022-05-26 | End: 2022-08-29

## 2022-05-26 NOTE — TELEPHONE ENCOUNTER
Patient is going out of town tomorrow and would like refill today.      amphetamine-dextroamphetamine (ADDERALL) 20 MG tablet      Last Written Prescription Date:  03/04/22  Last Fill Quantity: 60,   # refills: 0  Last Office Visit: 0428/22  Future Office visit:       Routing refill request to provider for review/approval because:  Drug not on the [facility] [place], [facility] or [facility] refill protocol or controlled substance

## 2022-05-26 NOTE — TELEPHONE ENCOUNTER
Pt called to inquire about a few medications. Pt requested the adderall to be sent, advised this was sent to Dr. Mendoza this AM, awaiting review and signature.     Pt then inquired about a cream and nasal spray given back in April.    fluocinonide (LIDEX) 0.05 % external ointment 60 g 3 4/28/2022  No   Sig - Route: Apply topically 2 times daily - Topical   Sent to pharmacy as: Fluocinonide 0.05 % External Ointment (LIDEX)   Class: E-Prescribe   Order: [id number]   E-Prescribing Status: Receipt confirmed by pharmacy (4/28/2022  3:02 PM CDT)     fluticasone (FLONASE) 50 MCG/ACT nasal spray 16 g 11 4/28/2022  No   Sig - Route: Spray 1-2 sprays into both nostrils daily - Both Nostrils   Sent to pharmacy as: Fluticasone Propionate 50 MCG/ACT Nasal Suspension   Class: E-Prescribe   Order: [id number]   E-Prescribing Status: Receipt confirmed by pharmacy (4/28/2022  3:02 PM CDT)     Pt noted [facility] is reporting they do not have it. Pt advised will resend.    Ricky LILLY RN   [facility] Triage

## 2022-08-30 ENCOUNTER — NURSE TRIAGE (OUTPATIENT)
Dept: FAMILY MEDICINE | Facility: CLINIC | Age: 57
End: 2022-08-30

## 2022-08-30 NOTE — TELEPHONE ENCOUNTER
Nurse Triage SBAR    Is this a 2nd Level Triage? NO    Situation: Patient calls, concerned about possible Shingles    Background: Patient was not feeling like himself last Thursday and on Friday started to notice rash on the left side of his back that he is concerned may be Shingles.     Assessment: Red bumpy rash, started on left side of his spine and wraps around to his abdomen. No new bumps developing the last 2 days. Rash does burn and itch. No fever or other symptoms present.    Protocol Recommended Disposition:   Home Care    Recommendation: Antivirals usually need to be started within 72 hours of symptoms, unlikely to be beneficial at this time. Home Care advice given to patient.      Does the patient meet one of the following criteria for ADS visit consideration? 16+ years old, with an [facility] PCP     TIP  Providers, please consider if this condition is appropriate for management at one of our Acute and Diagnostic Services sites.     If patient is a good candidate, please use dotphrase <dot>triageresponse and select Refer to ADS to document.    Angeli Walker RN  [facility]     Reason for Disposition    Shingles, questions about    Additional Information    Negative: Difficult to awaken or acting confused (e.g., disoriented, slurred speech)    Negative: Sounds like a life-threatening emergency to the triager    Negative: Localized rash and doesn't match the SYMPTOMS of shingles    Negative: Back pain and doesn't match the SYMPTOMS of shingles    Negative: Shingles Vaccine (Recombinant Zoster Vaccine; RZV; Shingrix), questions about    Negative: Shingles rash of face and eye pain or blurred vision    Negative: Shingles rash on the eyelid or tip of the nose    Negative: Shingles rash and spots start appearing other places on body    Negative: Patient sounds very sick or weak to the triager    Negative: Shingles rash (matches SYMPTOMS) and weak immune system (e.g., HIV positive,  cancer chemotherapy, chronic steroid treatment, splenectomy) and NOT taking antiviral medication    Negative: Shingles rash of face and facial weakness    Negative: Shingles rash of face or ear and earache or ringing in the ear    Negative: Fever > 100.4 F  (38.0 C)    Negative: SEVERE pain (e.g., excruciating)    Negative: Shingles rash (matches SYMPTOMS) and onset < 72 hours ago (3 days)    Negative: Looks infected (spreading redness, pus) and no fever    Negative: Patient wants to be seen    Negative: Shingles rash and onset > 72 hours ago    Protocols used: SHINGLES (ZOSTER)-A-[place]

## 2022-10-09 ENCOUNTER — HEALTH MAINTENANCE LETTER (OUTPATIENT)
Age: 57
End: 2022-10-09

## 2023-02-12 ENCOUNTER — HEALTH MAINTENANCE LETTER (OUTPATIENT)
Age: 58
End: 2023-02-12

## 2023-03-02 ENCOUNTER — OFFICE VISIT (OUTPATIENT)
Dept: FAMILY MEDICINE | Facility: CLINIC | Age: 58
End: 2023-03-02
Payer: COMMERCIAL

## 2023-03-02 VITALS
WEIGHT: 260 LBS | HEART RATE: 74 BPM | TEMPERATURE: 98 F | HEIGHT: 72 IN | SYSTOLIC BLOOD PRESSURE: 132 MMHG | RESPIRATION RATE: 14 BRPM | OXYGEN SATURATION: 100 % | BODY MASS INDEX: 35.21 KG/M2 | DIASTOLIC BLOOD PRESSURE: 78 MMHG

## 2023-03-02 DIAGNOSIS — Z13.0 SCREENING, DEFICIENCY ANEMIA, IRON: ICD-10-CM

## 2023-03-02 DIAGNOSIS — Z13.220 SCREENING FOR HYPERLIPIDEMIA: ICD-10-CM

## 2023-03-02 DIAGNOSIS — R21 RASH: ICD-10-CM

## 2023-03-02 DIAGNOSIS — R53.83 OTHER FATIGUE: ICD-10-CM

## 2023-03-02 DIAGNOSIS — R09.81 NASAL CONGESTION: ICD-10-CM

## 2023-03-02 DIAGNOSIS — Z00.00 ROUTINE GENERAL MEDICAL EXAMINATION AT A HEALTH CARE FACILITY: Primary | ICD-10-CM

## 2023-03-02 DIAGNOSIS — F90.2 ADHD (ATTENTION DEFICIT HYPERACTIVITY DISORDER), COMBINED TYPE: ICD-10-CM

## 2023-03-02 DIAGNOSIS — N52.9 ERECTILE DYSFUNCTION, UNSPECIFIED ERECTILE DYSFUNCTION TYPE: ICD-10-CM

## 2023-03-02 DIAGNOSIS — Z12.5 SCREENING FOR MALIGNANT NEOPLASM OF PROSTATE: ICD-10-CM

## 2023-03-02 DIAGNOSIS — Z13.1 SCREENING FOR DIABETES MELLITUS: ICD-10-CM

## 2023-03-02 DIAGNOSIS — R09.81 CONGESTION OF PARANASAL SINUS: ICD-10-CM

## 2023-03-02 PROCEDURE — 99396 PREV VISIT EST AGE 40-64: CPT | Performed by: FAMILY MEDICINE

## 2023-03-02 RX ORDER — DEXTROAMPHETAMINE SACCHARATE, AMPHETAMINE ASPARTATE, DEXTROAMPHETAMINE SULFATE AND AMPHETAMINE SULFATE 5; 5; 5; 5 MG/1; MG/1; MG/1; MG/1
10-20 TABLET ORAL 2 TIMES DAILY
Qty: 60 TABLET | Refills: 0 | Status: SHIPPED | OUTPATIENT
Start: 2023-03-02 | End: 2023-10-27

## 2023-03-02 RX ORDER — FLUOCINONIDE 0.5 MG/G
OINTMENT TOPICAL 2 TIMES DAILY
Qty: 60 G | Refills: 3 | Status: SHIPPED | OUTPATIENT
Start: 2023-03-02 | End: 2023-12-07

## 2023-03-02 RX ORDER — FEXOFENADINE HCL 180 MG/1
180 TABLET ORAL DAILY
Qty: 90 TABLET | Refills: 3 | Status: SHIPPED | OUTPATIENT
Start: 2023-03-02

## 2023-03-02 RX ORDER — FLUTICASONE PROPIONATE 50 MCG
1-2 SPRAY, SUSPENSION (ML) NASAL DAILY
Qty: 16 G | Refills: 11 | Status: SHIPPED | OUTPATIENT
Start: 2023-03-02

## 2023-03-02 RX ORDER — SILDENAFIL 50 MG/1
50 TABLET, FILM COATED ORAL DAILY PRN
Qty: 30 TABLET | Refills: 11 | Status: SHIPPED | OUTPATIENT
Start: 2023-03-02 | End: 2024-03-28

## 2023-03-02 ASSESSMENT — ENCOUNTER SYMPTOMS
WEAKNESS: 1
COUGH: 1
ARTHRALGIAS: 1
FREQUENCY: 1
SORE THROAT: 1

## 2023-03-02 NOTE — PROGRESS NOTES
SUBJECTIVE:   CC: Herman is an 58 year old who presents for preventative health visit.   Patient has been advised of split billing requirements and indicates understanding: Yes     Healthy Habits:     Getting at least 3 servings of Calcium per day:  Yes    Bi-annual eye exam:  NO    Dental care twice a year:  Yes    Sleep apnea or symptoms of sleep apnea:  Daytime drowsiness    Diet:  Other    Frequency of exercise:  None    Taking medications regularly:  Yes    Medication side effects:  None    PHQ-2 Total Score: 0    Additional concerns today:  No      Medication Followup of amphetamine-dextroamphetamine (ADDERALL) 20 MG tablet  Taking Medication as prescribed: yes  Side Effects:  None  Medication Helping Symptoms:  yes      Medication Followup of sildenafil (VIAGRA) 50 MG tablet  Taking Medication as prescribed: yes  Side Effects:  None  Medication Helping Symptoms:  yes    Today's PHQ-2 Score:   PHQ-2 ( 1999 Pfizer) 3/2/2023   Q1: Little interest or pleasure in doing things 0   Q2: Feeling down, depressed or hopeless 0   PHQ-2 Score 0   PHQ-2 Total Score (12-17 Years)- Positive if 3 or more points; Administer PHQ-A if positive -   Q1: Little interest or pleasure in doing things Not at all   Q2: Feeling down, depressed or hopeless Not at all   PHQ-2 Score 0       Social History     Tobacco Use     Smoking status: Never     Smokeless tobacco: Never   Substance Use Topics     Alcohol use: Yes     Alcohol/week: 3.0 - 4.0 standard drinks     Types: 3 - 4 Standard drinks or equivalent per week     Comment: 6 per month avg       Alcohol Use 3/2/2023   Prescreen: >3 drinks/day or >7 drinks/week? No   AUDIT SCORE  -     AUDIT - Alcohol Use Disorders Identification Test - Reproduced from the World Health Organization Audit 2001 (Second Edition) 1/6/2022   1.  How often do you have a drink containing alcohol? 2 to 3 times a week   2.  How many drinks containing alcohol do you have on a typical day when you are drinking? 1 or  2   3.  How often do you have five or more drinks on one occasion? Never   4.  How often during the last year have you found that you were not able to stop drinking once you had started? Never   5.  How often during the last year have you failed to do what was normally expected of you because of drinking? Never   6.  How often during the last year have you needed a first drink in the morning to get yourself going after a heavy drinking session? Never   7.  How often during the last year have you had a feeling of guilt or remorse after drinking? Never   8.  How often during the last year have you been unable to remember what happened the night before because of your drinking? Never   9.  Have you or someone else been injured because of your drinking? No   10. Has a relative, friend, doctor or other health care worker been concerned about your drinking or suggested you cut down? No   TOTAL SCORE 3     Last PSA:   PSA   Date Value Ref Range Status   03/20/2017 0.28 0 - 4 ug/L Final     Comment:     Assay Method:  Chemiluminescence using Siemens Vista analyzer       Reviewed orders with patient. Reviewed health maintenance and updated orders accordingly - Yes      Reviewed and updated as needed this visit by clinical staff   Tobacco  Allergies  Meds  Problems  Med Hx  Surg Hx  Fam Hx        Reviewed and updated as needed this visit by Provider   Tobacco  Allergies  Meds  Problems  Med Hx  Surg Hx  Fam Hx         Review of Systems   HENT: Positive for congestion and sore throat - sinus issues .    Respiratory: Positive for cough - .    Cardiovascular: Positive for peripheral edema.   Genitourinary: Positive for frequency and impotence.   Musculoskeletal: Positive for arthralgias.   Skin: Positive for rash.   Neurological: Positive for weakness.   Psychiatric/Behavioral: Positive for mood changes.       OBJECTIVE:   /78 (BP Location: Left arm, Patient Position: Sitting, Cuff Size: Adult Large)   Pulse  "74   Temp 98  F (36.7  C) (Tympanic)   Resp 14   Ht 1.836 m (6' 0.3\")   Wt 117.9 kg (260 lb)   SpO2 100%   BMI 34.97 kg/m    EXAM:  GENERAL: healthy, alert and no distress  EYES: Eyes grossly normal to inspection, PERRL and conjunctivae and sclerae normal  HENT: ear canals and TM's normal, nose and mouth without ulcers or lesions  NECK: no adenopathy, no asymmetry, masses, or scars and thyroid normal to palpation  RESP: lungs clear to auscultation - no rales, rhonchi or wheezes  BREAST: normal without masses, tenderness or nipple discharge and no palpable axillary masses or adenopathy  CV: regular rate and rhythm, normal S1 S2, no S3 or S4, no murmur, click or rub, no peripheral edema and peripheral pulses strong  ABDOMEN: soft, nontender, no hepatosplenomegaly, no masses and bowel sounds normal   (male): normal male genitalia without lesions or urethral discharge, no hernia  MS: no gross musculoskeletal defects noted, no edema  SKIN: no suspicious lesions or rashes  NEURO: Normal strength and tone, mentation intact and speech normal  PSYCH: mentation appears normal, affect normal/bright  LYMPH: no cervical, supraclavicular, axillary, or inguinal adenopathy  RECTAL: Couple of large skin tags that are most likely warts.  (He'd like to follow-up to have these removed)        ASSESSMENT/PLAN:   Routine general medical examination at a health care facility      ADHD (attention deficit hyperactivity disorder), combined type  Medication is helpful and he like to continue,  - amphetamine-dextroamphetamine (ADDERALL) 20 MG tablet  Dispense: 60 tablet; Refill: 0    Other fatigue  Increased fatigue lately we will rule out thyroid cause and recommended optimizing sleep, consider sleep study as well.  - TSH with free T4 reflex    Screening for diabetes mellitus  - Comprehensive metabolic panel    Screening, deficiency anemia, iron  - CBC with platelets    Screening for malignant neoplasm of prostate  - Prostate Specific " "Antigen Screen    Screening for hyperlipidemia  - Lipid panel reflex to direct LDL Fasting    Congestion of paranasal sinus  Nasal congestion  Ongoing issues and has worked with ENT previously and will follow-up there:  - Adult ENT  Referral  - fexofenadine (ALLEGRA ALLERGY) 180 MG tablet  Dispense: 90 tablet; Refill: 3  - fluticasone (FLONASE) 50 MCG/ACT nasal spray  Dispense: 16 g; Refill: 11    Rash  Multiple areas of eczematous rash and medicines are helpful  - fexofenadine (ALLEGRA ALLERGY) 180 MG tablet  Dispense: 90 tablet; Refill: 3  - fluocinonide (LIDEX) 0.05 % external ointment  Dispense: 60 g; Refill: 3    Erectile dysfunction, unspecified erectile dysfunction type  Stable - continue medication(s).  - sildenafil (VIAGRA) 50 MG tablet  Dispense: 30 tablet; Refill: 11      COUNSELING:  Reviewed preventive health counseling, as reflected in patient instructions      Estimated body mass index is 34.97 kg/m  as calculated from the following:    Height as of this encounter: 1.836 m (6' 0.3\").    Weight as of this encounter: 117.9 kg (260 lb).  Weight management plan: Discussed healthy diet and exercise guidelines     reports that he has never smoked. He has never used smokeless tobacco.      Return in about 1 year (around 3/2/2024) for wellness exam with fasting labs with Ricardo Mendoza MD.           Ricardo Mendoza MD     [address] [address]  [email or web address]     Office: [phone]     "

## 2023-06-23 ENCOUNTER — NURSE TRIAGE (OUTPATIENT)
Dept: NURSING | Facility: CLINIC | Age: 58
End: 2023-06-23
Payer: MEDICAID

## 2023-06-23 DIAGNOSIS — F90.2 ADHD (ATTENTION DEFICIT HYPERACTIVITY DISORDER), COMBINED TYPE: ICD-10-CM

## 2023-06-23 RX ORDER — DEXTROAMPHETAMINE SACCHARATE, AMPHETAMINE ASPARTATE, DEXTROAMPHETAMINE SULFATE AND AMPHETAMINE SULFATE 5; 5; 5; 5 MG/1; MG/1; MG/1; MG/1
10-20 TABLET ORAL 2 TIMES DAILY
Qty: 60 TABLET | Refills: 0 | Status: CANCELLED | OUTPATIENT
Start: 2023-06-23

## 2023-06-23 NOTE — TELEPHONE ENCOUNTER
"Patient is calling and wondering if the medication could be switched to the extended release Adderall.  He has only been taking 1/2 a tablet a day and feels \"its too much\".        Racquel Sandoval RN on 6/23/2023 at 5:19 PM    "

## 2023-06-23 NOTE — TELEPHONE ENCOUNTER
Medication Question or Refill    Contacts       Type Contact Phone/Fax    06/23/2023 05:08 PM CDT Phone (Incoming) Herman Joseph (Self) [phone] (H)          What medication are you calling about (include dose and sig)?: amphetamine-dextroamphetamine (ADDERALL) 20 MG tablet    Preferred Pharmacy:   [facility] - [place], [place] - [address]  [address] [address]  Phone: [phone] Fax: [phone] Alternate Fax: [phone], [phone]    [id number] - [place], [place] - [address]  [address] [place] [address]  Phone: [phone] Fax: [phone]      Controlled Substance Agreement on file:   CSA -- Patient Level:     [Media Unavailable] Controlled Substance Agreement - Non - Opioid - Scan on 2/24/2020  3:58 PM: NON-OPIOID CONTROLLED SUBSTANCE AGREEMENT       Who prescribed the medication?: unk    Do you need a refill? Yes    When did you use the medication last? unk    Patient offered an appointment? No    Do you have any questions or concerns?  Yes: Patient was wondering if he could start taking the extended release version of the adderall?      Could we send this information to you in [facility] or would you prefer to receive a phone call?:   Patient would prefer a phone call   Okay to leave a detailed message?: Yes at Home number on file [phone] (home)

## 2023-06-23 NOTE — TELEPHONE ENCOUNTER
"Triage Call:    Caller: Patient     Having what he thinks is a sinus infection. He isn't interested in coming in, but wants to know \"what kind of OTC meds can I take\".  He noted some green/yellow discharge [place]    Advised that could triage symptoms, he declined.   Advised to discuss with pharmacist, as could advise based on other medications he is on.     Protocol Recommended Disposition: Call to pharamcist    Caller verbalized understanding of instructions and questions answered.      Racquel Sandoval RN on 6/23/2023 at 5:20 PM        Reason for Disposition    [1] Caller has medicine question about med NOT prescribed by PCP AND [2] triager unable to answer question (e.g., compatibility with other med, storage)    Protocols used: MEDICATION QUESTION CALL-A-      "

## 2023-06-26 NOTE — TELEPHONE ENCOUNTER
Last office visit: 3/2/2023     Future Office Visit:        CSA -- Patient Level:     [Media Unavailable] Controlled Substance Agreement - Non - Opioid - Scan on 2/24/2020  3:58 PM: NON-OPIOID CONTROLLED SUBSTANCE AGREEMENT             Routing refill request to provider for review/approval because:  Drug not on the [facility] refill protocol     Mary Ely RN, BSN  [facility]

## 2023-06-27 RX ORDER — DEXTROAMPHETAMINE SACCHARATE, AMPHETAMINE ASPARTATE MONOHYDRATE, DEXTROAMPHETAMINE SULFATE AND AMPHETAMINE SULFATE 2.5; 2.5; 2.5; 2.5 MG/1; MG/1; MG/1; MG/1
10 CAPSULE, EXTENDED RELEASE ORAL DAILY
Qty: 30 CAPSULE | Refills: 0 | Status: SHIPPED | OUTPATIENT
Start: 2023-08-28 | End: 2023-11-16

## 2023-06-27 RX ORDER — DEXTROAMPHETAMINE SACCHARATE, AMPHETAMINE ASPARTATE MONOHYDRATE, DEXTROAMPHETAMINE SULFATE AND AMPHETAMINE SULFATE 2.5; 2.5; 2.5; 2.5 MG/1; MG/1; MG/1; MG/1
10 CAPSULE, EXTENDED RELEASE ORAL DAILY
Qty: 30 CAPSULE | Refills: 0 | Status: SHIPPED | OUTPATIENT
Start: 2023-07-28 | End: 2023-11-21

## 2023-06-27 RX ORDER — DEXTROAMPHETAMINE SACCHARATE, AMPHETAMINE ASPARTATE MONOHYDRATE, DEXTROAMPHETAMINE SULFATE AND AMPHETAMINE SULFATE 2.5; 2.5; 2.5; 2.5 MG/1; MG/1; MG/1; MG/1
10 CAPSULE, EXTENDED RELEASE ORAL DAILY
Qty: 30 CAPSULE | Refills: 0 | Status: SHIPPED | OUTPATIENT
Start: 2023-06-27 | End: 2023-07-27

## 2023-06-27 NOTE — TELEPHONE ENCOUNTER
I sent in the Adderall XR 10 mg tabs to take 1 tab daily.  If the Adderall XR 10 mg is not enough could take two of the Adderall XR 10 mg to equal Adderall XR 20 mg and next month I will send in the Adderall XR 20 mg.

## 2023-09-07 ENCOUNTER — NURSE TRIAGE (OUTPATIENT)
Dept: FAMILY MEDICINE | Facility: CLINIC | Age: 58
End: 2023-09-07
Payer: MEDICAID

## 2023-09-07 ENCOUNTER — TELEPHONE (OUTPATIENT)
Dept: FAMILY MEDICINE | Facility: CLINIC | Age: 58
End: 2023-09-07
Payer: MEDICAID

## 2023-09-07 NOTE — CONFIDENTIAL NOTE
Patient calling to check on the Adderall refill. Advised to check with pharmacy as the provider sent a 3 month supply in June,  patient verbalized understanding.

## 2023-09-07 NOTE — TELEPHONE ENCOUNTER
S-(situation): neurological deficit    B-(background): noting numbness in bilateral toes.  Right 3 outer toes are numb and the left pinkie toe is a little numb    A-(assessment): stated skin is very hard and calloused.  Has not been as good about taking care of his feet lately.  Denied swelling, pain, increased warmth, redness.      R-(recommendations): Per protocol, patient should be seen within 3 days and wishes to see only Dr. Mendoza.  No appointment any time soon, can patient use a virtual spot to come to the clinic for evaluation?  Please advise, thanks.        Reason for Disposition   Numbness or tingling on both sides of body and is a new symptom lasting > 24 hours    Additional Information   Negative: Difficult to awaken or acting confused (e.g., disoriented, slurred speech)   Negative: New neurologic deficit that is present NOW, sudden onset of ANY of the following: * Weakness of the face, arm, or leg on one side of the body* Numbness of the face, arm, or leg on one side of the body* Loss of speech or garbled speech   Negative: Sounds like a life-threatening emergency to the triager   Negative: SEVERE weakness (i.e., unable to walk or barely able to walk, requires support) and new-onset or worsening   Negative: Confusion, disorientation, or hallucinations is main symptom   Negative: Dizziness is main symptom   Negative: Followed a head injury within last 3 days   Negative: Headache (with neurologic deficit)   Negative: Unable to urinate (or only a few drops) and bladder feels very full   Negative: Loss of bladder or bowel control (urine or bowel incontinence; wetting self, leaking stool) of new-onset   Negative: Back pain with numbness (loss of sensation) in groin or rectal area   Negative: Neurologic deficit that was brief (now gone), ANY of the following: * Weakness of the face, arm, or leg on one side of the body * Numbness of the face, arm, or leg on one side of the body * Loss of speech or garbled  "speech   Negative: Patient sounds very sick or weak to the triager   Negative: Neurologic deficit of gradual onset (e.g., days to weeks), ANY of the following: * Weakness of the face, arm, or leg on one side of the body* Numbness of the face, arm, or leg on one side of the body* Loss of speech or garbled speech   Negative: [place] palsy suspected (i.e., weakness only one side of the face, developing over hours to days, no other symptoms)   Negative: Tingling (e.g., pins and needles) of the face, arm or leg on one side of the body, that is present now (Exceptions: Chronic or recurrent symptom lasting > 4 weeks; or tingling from known cause, such as: bumped elbow, carpal tunnel syndrome, pinched nerve, frostbite.)   Negative: Neck pain (with neurologic deficit)   Negative: Back pain (with neurologic deficit)   Negative: Patient wants to be seen    Answer Assessment - Initial Assessment Questions  1. SYMPTOM: \"What is the main symptom you are concerned about?\" (e.g., weakness, numbness)      Numbness in toes  2. ONSET: \"When did this start?\" (minutes, hours, days; while sleeping)      Worsening over time  3. LAST NORMAL: \"When was the last time you (the patient) were normal (no symptoms)?\"      unsure  4. PATTERN \"Does this come and go, or has it been constant since it started?\"  \"Is it present now?\"      Constant-yes  5. CARDIAC SYMPTOMS: \"Have you had any of the following symptoms: chest pain, difficulty breathing, palpitations?\"      no  6. NEUROLOGIC SYMPTOMS: \"Have you had any of the following symptoms: headache, dizziness, vision loss, double vision, changes in speech, unsteady on your feet?\"      no  7. OTHER SYMPTOMS: \"Do you have any other symptoms?\"      no  8. PREGNANCY: \"Is there any chance you are pregnant?\" \"When was your last menstrual period?\"      N/a    Protocols used: Neurologic Deficit-A-[place]    "

## 2023-09-08 NOTE — TELEPHONE ENCOUNTER
Okay to use same-day, next day, virtual release or virtual slots (do not use provider approval slot).

## 2023-09-12 ENCOUNTER — MYC MEDICAL ADVICE (OUTPATIENT)
Dept: INTERNAL MEDICINE | Facility: CLINIC | Age: 58
End: 2023-09-12
Payer: MEDICAID

## 2023-09-12 NOTE — TELEPHONE ENCOUNTER
Called patient and was unable to leave a message as the voice mail was full.    Myc message sent to patient.

## 2023-09-19 NOTE — TELEPHONE ENCOUNTER
Future Appointments 9/19/2023 - 3/17/2024        Date Visit Type Length Department Provider     9/22/2023 10:20 AM OFFICE VISIT 20 min [facility] Lanre Mendoza MD    Location Instructions:     [facility] is located at [address] [place] [address], along [address] Free parking is available; access the lot by turning north from [address] onto [address], then west onto [address].

## 2023-10-27 DIAGNOSIS — F90.2 ADHD (ATTENTION DEFICIT HYPERACTIVITY DISORDER), COMBINED TYPE: ICD-10-CM

## 2023-10-27 RX ORDER — DEXTROAMPHETAMINE SACCHARATE, AMPHETAMINE ASPARTATE, DEXTROAMPHETAMINE SULFATE AND AMPHETAMINE SULFATE 5; 5; 5; 5 MG/1; MG/1; MG/1; MG/1
10-20 TABLET ORAL 2 TIMES DAILY
Qty: 60 TABLET | Refills: 0 | Status: SHIPPED | OUTPATIENT
Start: 2023-10-27 | End: 2023-11-16 | Stop reason: ALTCHOICE

## 2023-10-27 NOTE — TELEPHONE ENCOUNTER
No showed recent med follow-up appointment, please reschedule.  1 refill sent for now.    Last visit in this dept:    3/2/2023     Last visit -this provider:  9/7/2023     Next visit in this dept:   Future Appointments 10/27/2023 - 4/24/2024      None            Health Maintenance   Topic Date Due    LIPID  03/20/2018    PSA  03/20/2018    CMP  04/27/2019    CBC  04/27/2019    ZOSTER IMMUNIZATION (2 of 2) 03/17/2020    INFLUENZA VACCINE (1) 09/01/2023    YEARLY PREVENTIVE VISIT  03/02/2024    ANNUAL REVIEW OF HM ORDERS  03/02/2024    COLORECTAL CANCER SCREENING  02/11/2026    ADVANCE CARE PLANNING  01/14/2027    DTAP/TDAP/TD IMMUNIZATION (4 - Td or Tdap) 01/21/2030    HEPATITIS C SCREENING  Completed    PHQ-2 (once per calendar year)  Completed    Pneumococcal Vaccine: Pediatrics (0 to 5 Years) and At-Risk Patients (6 to 64 Years)  Aged Out    IPV IMMUNIZATION  Aged Out    HPV IMMUNIZATION  Aged Out    MENINGITIS IMMUNIZATION  Aged Out    HIV SCREENING  Discontinued    HEPATITIS B IMMUNIZATION  Discontinued    COVID-19 Vaccine  Discontinued        CSA -- Patient Level:     [Media Unavailable] Controlled Substance Agreement - Non - Opioid - Scan on 2/24/2020  3:58 PM: NON-OPIOID CONTROLLED SUBSTANCE AGREEMENT

## 2023-10-27 NOTE — LETTER
November 7, 2023      Herman Joseph  [id number] [place] [address]        We received a refill request from your pharmacy for your amphetamine-dextroamphetamine (ADDERALL) 20 MG tablet medication.  At this time the nurses were able to give you a kyle refill, but you are due to be seen for a med check before the next refill.  This appointment can be scheduled by calling [phone] or can be scheduled via GoPro as well.    Thank you for choosing [facility]            Sincerely,                Ricardo Mendoza M.D.

## 2023-10-31 NOTE — TELEPHONE ENCOUNTER
Left message to call back. When patient calls back please advise of Dr. Mendoza's message below and help schedule appointment.

## 2023-11-15 DIAGNOSIS — F90.2 ADHD (ATTENTION DEFICIT HYPERACTIVITY DISORDER), COMBINED TYPE: ICD-10-CM

## 2023-11-15 RX ORDER — DEXTROAMPHETAMINE SACCHARATE, AMPHETAMINE ASPARTATE, DEXTROAMPHETAMINE SULFATE AND AMPHETAMINE SULFATE 5; 5; 5; 5 MG/1; MG/1; MG/1; MG/1
TABLET ORAL
Qty: 60 TABLET | Refills: 0 | OUTPATIENT
Start: 2023-11-15

## 2023-11-15 NOTE — TELEPHONE ENCOUNTER
Refill for immediate release sent 10/27. Pt asks that Rx be sent for the ER as this is what he was taking most recently and IR refill was ordered accidentally. He did pick this up and has been using it but reports more side effects with this. Please send ER for Pt if appropriate. Writer also rescheduled med check with Dr. Mendoza that Pt had no showed for. Advised he has labs ordered that should be completed. He states he will go to [facility] lab for these. Thank you.     Analia Arriaza RN [facility]

## 2023-11-16 RX ORDER — DEXTROAMPHETAMINE SACCHARATE, AMPHETAMINE ASPARTATE MONOHYDRATE, DEXTROAMPHETAMINE SULFATE AND AMPHETAMINE SULFATE 2.5; 2.5; 2.5; 2.5 MG/1; MG/1; MG/1; MG/1
10 CAPSULE, EXTENDED RELEASE ORAL DAILY
Qty: 30 CAPSULE | Refills: 0 | Status: SHIPPED | OUTPATIENT
Start: 2023-11-16 | End: 2023-11-21

## 2023-11-17 NOTE — TELEPHONE ENCOUNTER
Sent    Has appt    Last visit in this dept:    3/2/2023     Last visit -this provider:  9/7/2023     Next visit in this dept:   Future Appointments 11/16/2023 - 5/14/2024        Date Visit Type Length Department Provider     11/21/2023  4:40 PM OFFICE VISIT 20 min [facility] Lanre Mendoza MD    Location Instructions:     [facility] is located at [address] [place] [address], along [address] Free parking is available; access the lot by turning north from [address] onto [address], then west onto [address].                     Health Maintenance   Topic Date Due    LIPID  03/20/2018    PSA  03/20/2018    CMP  04/27/2019    CBC  04/27/2019    ZOSTER IMMUNIZATION (2 of 2) 03/17/2020    INFLUENZA VACCINE (1) 09/01/2023    YEARLY PREVENTIVE VISIT  03/02/2024    ANNUAL REVIEW OF HM ORDERS  03/02/2024    COLORECTAL CANCER SCREENING  02/11/2026    ADVANCE CARE PLANNING  01/14/2027    DTAP/TDAP/TD IMMUNIZATION (4 - Td or Tdap) 01/21/2030    HEPATITIS C SCREENING  Completed    PHQ-2 (once per calendar year)  Completed    Pneumococcal Vaccine: Pediatrics (0 to 5 Years) and At-Risk Patients (6 to 64 Years)  Aged Out    IPV IMMUNIZATION  Aged Out    HPV IMMUNIZATION  Aged Out    MENINGITIS IMMUNIZATION  Aged Out    RSV MONOCLONAL ANTIBODY  Aged Out    HIV SCREENING  Discontinued    HEPATITIS B IMMUNIZATION  Discontinued    COVID-19 Vaccine  Discontinued        CSA -- Patient Level:     [Media Unavailable] Controlled Substance Agreement - Non - Opioid - Scan on 2/24/2020  3:58 PM: NON-OPIOID CONTROLLED SUBSTANCE AGREEMENT

## 2023-11-21 ENCOUNTER — OFFICE VISIT (OUTPATIENT)
Dept: FAMILY MEDICINE | Facility: CLINIC | Age: 58
End: 2023-11-21
Payer: COMMERCIAL

## 2023-11-21 VITALS
TEMPERATURE: 98.4 F | HEIGHT: 72 IN | BODY MASS INDEX: 34.13 KG/M2 | OXYGEN SATURATION: 97 % | HEART RATE: 54 BPM | SYSTOLIC BLOOD PRESSURE: 112 MMHG | WEIGHT: 252 LBS | RESPIRATION RATE: 18 BRPM | DIASTOLIC BLOOD PRESSURE: 82 MMHG

## 2023-11-21 DIAGNOSIS — B35.1 ONYCHOMYCOSIS: ICD-10-CM

## 2023-11-21 DIAGNOSIS — L03.032 CELLULITIS OF TOE OF LEFT FOOT: ICD-10-CM

## 2023-11-21 DIAGNOSIS — F90.2 ADHD (ATTENTION DEFICIT HYPERACTIVITY DISORDER), COMBINED TYPE: Primary | ICD-10-CM

## 2023-11-21 DIAGNOSIS — Z12.5 SCREENING FOR PROSTATE CANCER: ICD-10-CM

## 2023-11-21 DIAGNOSIS — Z13.220 SCREENING FOR HYPERLIPIDEMIA: ICD-10-CM

## 2023-11-21 DIAGNOSIS — R06.83 SNORING: ICD-10-CM

## 2023-11-21 DIAGNOSIS — B35.3 TINEA PEDIS OF BOTH FEET: ICD-10-CM

## 2023-11-21 DIAGNOSIS — R20.2 NUMBNESS AND TINGLING OF BOTH FEET: ICD-10-CM

## 2023-11-21 DIAGNOSIS — R60.0 BILATERAL LEG EDEMA: ICD-10-CM

## 2023-11-21 DIAGNOSIS — R53.83 OTHER FATIGUE: ICD-10-CM

## 2023-11-21 DIAGNOSIS — R20.0 NUMBNESS AND TINGLING OF BOTH FEET: ICD-10-CM

## 2023-11-21 PROCEDURE — 99214 OFFICE O/P EST MOD 30 MIN: CPT | Performed by: FAMILY MEDICINE

## 2023-11-21 RX ORDER — DEXTROAMPHETAMINE SACCHARATE, AMPHETAMINE ASPARTATE MONOHYDRATE, DEXTROAMPHETAMINE SULFATE AND AMPHETAMINE SULFATE 3.75; 3.75; 3.75; 3.75 MG/1; MG/1; MG/1; MG/1
15 CAPSULE, EXTENDED RELEASE ORAL DAILY
Qty: 30 CAPSULE | Refills: 0 | Status: SHIPPED | OUTPATIENT
Start: 2024-01-20 | End: 2024-03-28

## 2023-11-21 RX ORDER — TERBINAFINE HYDROCHLORIDE 250 MG/1
250 TABLET ORAL DAILY
Qty: 60 TABLET | Refills: 0 | Status: SHIPPED | OUTPATIENT
Start: 2023-11-21 | End: 2024-09-03

## 2023-11-21 RX ORDER — DEXTROAMPHETAMINE SACCHARATE, AMPHETAMINE ASPARTATE MONOHYDRATE, DEXTROAMPHETAMINE SULFATE AND AMPHETAMINE SULFATE 3.75; 3.75; 3.75; 3.75 MG/1; MG/1; MG/1; MG/1
15 CAPSULE, EXTENDED RELEASE ORAL DAILY
Qty: 30 CAPSULE | Refills: 0 | Status: SHIPPED | OUTPATIENT
Start: 2023-11-21 | End: 2023-12-21

## 2023-11-21 RX ORDER — DEXTROAMPHETAMINE SACCHARATE, AMPHETAMINE ASPARTATE, DEXTROAMPHETAMINE SULFATE AND AMPHETAMINE SULFATE 5; 5; 5; 5 MG/1; MG/1; MG/1; MG/1
10-20 TABLET ORAL 2 TIMES DAILY
Qty: 60 TABLET | Refills: 0 | Status: CANCELLED | OUTPATIENT
Start: 2023-12-21 | End: 2024-01-20

## 2023-11-21 RX ORDER — DEXTROAMPHETAMINE SACCHARATE, AMPHETAMINE ASPARTATE MONOHYDRATE, DEXTROAMPHETAMINE SULFATE AND AMPHETAMINE SULFATE 3.75; 3.75; 3.75; 3.75 MG/1; MG/1; MG/1; MG/1
15 CAPSULE, EXTENDED RELEASE ORAL DAILY
Qty: 30 CAPSULE | Refills: 0 | Status: SHIPPED | OUTPATIENT
Start: 2023-12-21 | End: 2024-04-09

## 2023-11-21 RX ORDER — CEPHALEXIN 500 MG/1
500 CAPSULE ORAL 3 TIMES DAILY
Qty: 21 CAPSULE | Refills: 0 | Status: SHIPPED | OUTPATIENT
Start: 2023-11-21 | End: 2023-11-28

## 2023-11-21 NOTE — PROGRESS NOTES
Assessment & Plan   ADHD (attention deficit hyperactivity disorder), combined type  Partial relief of symptoms and will increase dose by 15 mg.  He did try immediate release but did not like how he felt on that and would like to go back to the extended release.  - amphetamine-dextroamphetamine (ADDERALL XR) 15 MG 24 hr capsule  Dispense: 30 capsule; Refill: 0  - amphetamine-dextroamphetamine (ADDERALL XR) 15 MG 24 hr capsule  Dispense: 30 capsule; Refill: 0  - amphetamine-dextroamphetamine (ADDERALL XR) 15 MG 24 hr capsule  Dispense: 30 capsule; Refill: 0    Bilateral leg edema  Mild symptoms occasionally, salt and extended standing can cause this.  Recommended compression socks as needed.  We will check labs:  - COMPREHENSIVE METABOLIC PANEL    Other fatigue  Intermittent for some time and will check blood work  - COMPREHENSIVE METABOLIC PANEL  - CBC with Platelets    Snoring  Has had previous sleep studies that were mild or no symptoms at all.    Tinea pedis of both feet  Significant dry, scaling skin on the sole especially around the toes  - terbinafine (LAMISIL) 250 MG tablet  Dispense: 60 tablet; Refill: 0    Onychomycosis  Onycholysis and thick nails especially the first nail plates, will try Lamisil course of 30 days on, 30 days off and then another 30 days on and discussed risks and benefits.  - terbinafine (LAMISIL) 250 MG tablet  Dispense: 60 tablet; Refill: 0    Cellulitis of toe of left foot  Some redness of the left first toe where there is some cracking and possible early cellulitis in addition there is couple blisters that are formed and will treat with cephalexin.  - cephALEXin (KEFLEX) 500 MG capsule  Dispense: 21 capsule; Refill: 0    Numbness and tingling of both feet  Numbness mainly around the toes could be due to dry scaling skin as well as thick calluses that are not helping.  No history of significant diabetes and will check labs:  - Vitamin B12  - Anti Nuclear [place] IgG by IFA with Reflex  -  "Protein electrophoresis  - Protein electrophoresis timed urine  - Lyme Disease Total Abs Bld with Reflex to Confirm CLIA  - Erythrocyte sedimentation rate auto  - Glucose  - Hemoglobin A1c    Screening for prostate cancer  - PROSTATE SPEC ANTIGEN SCREEN      Return in about 3 months (around 2/21/2024) for medication recheck or sooner if symptoms not improving or worsen..          Ricardo Mendoza MD      [address] [address]  [place]   Office: [phone]       Saud Sutton is a 58 year old, presenting for the following health issues:  Recheck Medication    History of Present Illness       Reason for visit:  Feet- dry cracking    He eats 0-1 servings of fruits and vegetables daily.He consumes 5 sweetened beverage(s) daily.He exercises with enough effort to increase his heart rate 9 or less minutes per day.  He exercises with enough effort to increase his heart rate 3 or less days per week. He is missing 1 dose(s) of medications per week.  He is not taking prescribed medications regularly due to side effects.       Medication Followup of Adderall   Taking Medication as prescribed: yes  Side Effects:  Clenching teeth, Headache   Medication Helping Symptoms:  yes but not worth the side effects.  Would like to discuss doing the slow release.         Review of Systems         Objective    /82   Pulse 54   Temp 98.4  F (36.9  C)   Resp 18   Ht 1.836 m (6' 0.3\")   Wt 114.3 kg (252 lb)   SpO2 97%   BMI 33.89 kg/m    Body mass index is 33.89 kg/m .  Physical Exam   GENERAL: healthy, alert and no distress  NECK: no adenopathy, no asymmetry, masses, or scars and thyroid normal to palpation  RESP: lungs clear to auscultation - no rales, rhonchi or wheezes  CV: regular rate and rhythm, normal S1 S2, no S3 or S4, no murmur, click or rub, no peripheral edema and peripheral pulses strong  ABDOMEN: soft, nontender, no hepatosplenomegaly, no masses and " bowel sounds normal  MS: no gross musculoskeletal defects noted, no edema  SKIN: Thickened first toenails in particular and some minor symptoms in other nails consistent with onychomycosis, scaling across the moccasin distribution of both feet and some dryness along the ankles otherwise otherwise no suspicious lesions or rashes  NEURO: Normal strength and tone, mentation intact and speech normal  BACK: no CVA tenderness, no paralumbar tenderness  PSYCH: mentation appears normal, affect normal/bright

## 2023-11-21 NOTE — PATIENT INSTRUCTIONS
"Patient Education     Tips for Sleep Hygiene  \"Sleep hygiene\" means having good sleep habits.Follow these tips to sleep better at night:   Get on a schedule. Go to bed and get up at about the same time every day.  Listen to your body. Only try to sleep when you actually feel tired or sleepy.  Be patient. If you haven't been able to get to sleep after about 30 minutes or more, get up and do something calming or boring until you feel sleepy. Then return to bed and try again.  Don't have caffeine (coffee, tea, cola drinks, chocolate and some medicines), alcohol or nicotine (cigarettes). These can make it harder for you to fall asleep and stay asleep.  Use your bed for sleeping only. That means no TV, computer or homework in bed, especially during the evening and before bedtime.  Don't nap during the day. If you must nap, make sure it is for less than 20 minutes.  Create sleep rituals that remind your body it is time to sleep. Examples include breathing exercises, stretching or reading a book.  Avoid all electronic media (smart phone, computer, tablet) within 2 hours of bed time. The \"blue light\" in these devices activates the part of the brain that keeps you awake.  Dim the lights at night.  Get early morning sources of light (walk in the sunshine) to help set sleep patterns at night.  Try a bath or shower before bed. Having a warm bath 1 to 2 hours before bedtime can help you feel sleepy. Hot baths can make you alert, so be mindful of the temperature.  Don't watch the clock. Checking the clock during the night can wake you up. It can also lead to negative thoughts such as, \"I will never fall asleep,\" which can increase anxiety and sleeplessness.  Use a sleep diary. Track your sleep schedule to know your sleep patterns and to see where you can improve.  Get regular exercise every day. Try not to do heavy exercise in the 4 hours before bedtime.  Eat a healthy, balanced diet.  Try eating a light, healthy snack before " bed, but avoid eating a heavy meal.  Create the right sleeping area. A cool, dark, quiet room is best. If needed, try earplugs, fans and blackout curtains.  Keep your daytime routine the same even if you have a bad night sleep. Avoiding activities the next day can make it harder to sleep.  For informational purposes only. Not to replace the advice of your health care provider.   Copyright   2013 [employer]. All rights reserved. [employer] 021904 - 01/16.  For informational purposes only. Not to replace the advice of your health care provider.  Copyright   2018 [employer]. All rights reserved.

## 2023-11-21 NOTE — LETTER
[facility] [place]  11/21/23  Patient: Herman Joseph  YOB: 1965  Medical Record Number: [id number]                                                                                  Non-Opioid Controlled Substance Agreement    This is an agreement between you and your provider regarding safe and appropriate use of controlled substances prescribed by your care team. Controlled substances are?medicines that can cause physical and mental dependence (abuse).     There are strict laws about having and using these medicines. We here at [facility] are  committed to working with you in your efforts to get better. To support you in this work, we'll help you schedule regular office appointments for medicine refills. If we must cancel or change your appointment for any reason, we'll make sure you have enough medicine to last until your next appointment.     As a Provider, I will:   Listen carefully to your concerns while treating you with respect.   Recommend a treatment plan that I believe is in your best interest and may involve therapies other than medicine.    Talk with you often about the possible benefits and the risk of harm of any medicine that we prescribe for you.  Assess the safety of this medicine and check how well it works.    Provide a plan on how to taper (discontinue or go off) using this medicine if the decision is made to stop its use.      ::  As a Patient, I understand controlled substances:     Are prescribed by my care provider to help me function or work and manage my condition(s).?  Are strong medicines and can cause serious side effects.     Need to be taken exactly as prescribed.?Combining controlled substances with certain medicines or chemicals (such as illegal drugs, alcohol, sedatives, sleeping pills, and benzodiazepines) can be dangerous or even fatal.? If I stop taking my medicines suddenly, I may have severe withdrawal symptoms.     The risks, benefits, and  side effects of these medicine(s) were explained to me. I agree that:    I will take part in other treatments as advised by my care team. This may be psychiatry or counseling, physical therapy, behavioral therapy, group treatment or a referral to specialist.    I will keep all my appointments and understand this is part of the monitoring of controlled substances.?My care team may require an office visit for EVERY controlled substance refill. If I miss appointments or don t follow instructions, my care team may stop my medicine    I will take my medicines as prescribed. I will not change the dose or schedule unless my care team tells me to. There will be no refills if I run out early.      I may be asked to come to the clinic and complete a urine drug test or complete a pill count. If I don t give a urine sample or participate in a pill count, the care team may stop my medicine.    I will only receive controlled substance prescriptions from this clinic. If I am treated by another provider, I will tell them that I am taking controlled substances and that I have a treatment agreement with this provider. I will inform my [facility] care team within one business day if I am given a prescription for any controlled substance by another healthcare provider. My [facility] care team can contact other providers and pharmacists about my use of any medicines.    It is up to me to make sure that I don't run out of my medicines on weekends or holidays.?If my care team is willing to refill my prescription without a visit, I must request refills only during office hours. Refills may take up to 3 business days to process. I will use one pharmacy to fill all my controlled substance prescriptions. I will notify the clinic about any changes to my insurance or medicine availability.    I am responsible for my prescriptions. If the medicine/prescription is lost, stolen or destroyed, it will not be replaced.?I also agree not  to share controlled substance medicines with anyone.     I am aware I should not use any illegal or recreational drugs. I agree not to drink alcohol unless my care team says I can.     If I enroll in the [place] [facility] Cannabis program, I will tell my care team before my next refill.    I will tell my care team right away if I become pregnant, have a new medical problem treated outside of my regular clinic, or have a change in my medicines.     I understand that this medicine can affect my thinking, judgment and reaction time.? Alcohol and drugs affect the brain and body, which can affect the safety of my driving. Being under the influence of alcohol or drugs can affect my decision-making, behaviors, personal safety and the safety of others. Driving while impaired (DWI) can occur if a person is driving, operating or in physical control of a car, motorcycle, boat, snowmobile, ATV, motorbike, off-road vehicle or any other motor vehicle ([place] Statute 169A.20). I understand the risk if I choose to drive or operate any vehicle or machinery.    I understand that if I do not follow any of the conditions above, my prescriptions or treatment may be stopped or changed.   I agree that my provider, clinic care team and pharmacy may work with any city, state or federal law enforcement agency that investigates the misuse, sale or other diversion of my controlled medicine. I will allow my provider to discuss my care with, or share a copy of, this agreement with any other treating provider, pharmacy or emergency room where I receive care.     I have read this agreement and have asked questions about anything I did not understand.    ________________________________________________________  Patient Signature - Herman Joseph     ___________________                   Date     ________________________________________________________  Provider Signature - Lanre Mendoza MD       ___________________                   Date      ________________________________________________________  Witness Signature (required if provider not present while patient signing)          ___________________                   Date

## 2023-11-29 ENCOUNTER — LAB (OUTPATIENT)
Dept: LAB | Facility: CLINIC | Age: 58
End: 2023-11-29
Payer: COMMERCIAL

## 2023-11-29 DIAGNOSIS — R20.2 NUMBNESS AND TINGLING OF BOTH FEET: ICD-10-CM

## 2023-11-29 DIAGNOSIS — R60.0 BILATERAL LEG EDEMA: ICD-10-CM

## 2023-11-29 DIAGNOSIS — R53.83 OTHER FATIGUE: ICD-10-CM

## 2023-11-29 DIAGNOSIS — Z12.5 SCREENING FOR PROSTATE CANCER: ICD-10-CM

## 2023-11-29 DIAGNOSIS — R20.0 NUMBNESS AND TINGLING OF BOTH FEET: ICD-10-CM

## 2023-11-29 LAB
ALBUMIN SERPL BCG-MCNC: 4.2 G/DL (ref 3.5–5.2)
ALP SERPL-CCNC: 79 U/L (ref 40–150)
ALT SERPL W P-5'-P-CCNC: 24 U/L (ref 0–70)
ANION GAP SERPL CALCULATED.3IONS-SCNC: 7 MMOL/L (ref 7–15)
AST SERPL W P-5'-P-CCNC: 21 U/L (ref 0–45)
B BURGDOR IGG+IGM SER QL: 0.07
BILIRUB SERPL-MCNC: 0.7 MG/DL
BUN SERPL-MCNC: 16.7 MG/DL (ref 6–20)
CALCIUM SERPL-MCNC: 9.1 MG/DL (ref 8.6–10)
CHLORIDE SERPL-SCNC: 100 MMOL/L (ref 98–107)
CREAT SERPL-MCNC: 1.05 MG/DL (ref 0.67–1.17)
DEPRECATED HCO3 PLAS-SCNC: 30 MMOL/L (ref 22–29)
EGFRCR SERPLBLD CKD-EPI 2021: 82 ML/MIN/1.73M2
ERYTHROCYTE [DISTWIDTH] IN BLOOD BY AUTOMATED COUNT: 13.2 % (ref 10–15)
ERYTHROCYTE [SEDIMENTATION RATE] IN BLOOD BY WESTERGREN METHOD: 10 MM/HR (ref 0–20)
FASTING STATUS PATIENT QL REPORTED: YES
GLUCOSE SERPL-MCNC: 96 MG/DL (ref 70–99)
GLUCOSE SERPL-MCNC: 96 MG/DL (ref 70–99)
HBA1C MFR BLD: 5.5 %
HCT VFR BLD AUTO: 42.7 % (ref 40–53)
HGB BLD-MCNC: 14 G/DL (ref 13.3–17.7)
MCH RBC QN AUTO: 30 PG (ref 26.5–33)
MCHC RBC AUTO-ENTMCNC: 32.8 G/DL (ref 31.5–36.5)
MCV RBC AUTO: 92 FL (ref 78–100)
PLATELET # BLD AUTO: 216 10E3/UL (ref 150–450)
POTASSIUM SERPL-SCNC: 4.3 MMOL/L (ref 3.4–5.3)
PROT SERPL-MCNC: 6.9 G/DL (ref 6.4–8.3)
PSA SERPL DL<=0.01 NG/ML-MCNC: 0.33 NG/ML (ref 0–3.5)
RBC # BLD AUTO: 4.66 10E6/UL (ref 4.4–5.9)
SODIUM SERPL-SCNC: 137 MMOL/L (ref 135–145)
TOTAL PROTEIN SERUM FOR ELP: 6.6 G/DL (ref 6.4–8.3)
TSH SERPL DL<=0.005 MIU/L-ACNC: 3.85 UIU/ML (ref 0.3–4.2)
VIT B12 SERPL-MCNC: 421 PG/ML (ref 232–1245)
WBC # BLD AUTO: 4.7 10E3/UL (ref 4–11)

## 2023-11-29 PROCEDURE — 84155 ASSAY OF PROTEIN SERUM: CPT | Mod: 91

## 2023-11-29 PROCEDURE — 84165 PROTEIN E-PHORESIS SERUM: CPT | Mod: TC | Performed by: PATHOLOGY

## 2023-11-29 PROCEDURE — 36415 COLL VENOUS BLD VENIPUNCTURE: CPT

## 2023-11-29 PROCEDURE — 82607 VITAMIN B-12: CPT

## 2023-11-29 PROCEDURE — 85652 RBC SED RATE AUTOMATED: CPT

## 2023-11-29 PROCEDURE — 85027 COMPLETE CBC AUTOMATED: CPT

## 2023-11-29 PROCEDURE — G0103 PSA SCREENING: HCPCS

## 2023-11-29 PROCEDURE — 83036 HEMOGLOBIN GLYCOSYLATED A1C: CPT

## 2023-11-29 PROCEDURE — 86618 LYME DISEASE ANTIBODY: CPT

## 2023-11-29 PROCEDURE — 84165 PROTEIN E-PHORESIS SERUM: CPT | Mod: 26 | Performed by: PATHOLOGY

## 2023-11-29 PROCEDURE — 80053 COMPREHEN METABOLIC PANEL: CPT

## 2023-11-29 PROCEDURE — 86038 ANTINUCLEAR ANTIBODIES: CPT

## 2023-11-29 PROCEDURE — 84443 ASSAY THYROID STIM HORMONE: CPT

## 2023-11-30 LAB
ALBUMIN SERPL ELPH-MCNC: 4 G/DL (ref 3.7–5.1)
ALPHA1 GLOB SERPL ELPH-MCNC: 0.3 G/DL (ref 0.2–0.4)
ALPHA2 GLOB SERPL ELPH-MCNC: 0.6 G/DL (ref 0.5–0.9)
ANA SER QL IF: NEGATIVE
B-GLOBULIN SERPL ELPH-MCNC: 0.8 G/DL (ref 0.6–1)
GAMMA GLOB SERPL ELPH-MCNC: 0.9 G/DL (ref 0.7–1.6)
M PROTEIN SERPL ELPH-MCNC: 0 G/DL
PROT PATTERN SERPL ELPH-IMP: NORMAL

## 2023-12-01 ENCOUNTER — NURSE TRIAGE (OUTPATIENT)
Dept: NURSING | Facility: CLINIC | Age: 58
End: 2023-12-01

## 2023-12-01 ENCOUNTER — NURSE TRIAGE (OUTPATIENT)
Dept: FAMILY MEDICINE | Facility: CLINIC | Age: 58
End: 2023-12-01

## 2023-12-01 ENCOUNTER — TELEPHONE (OUTPATIENT)
Dept: OTHER | Facility: CLINIC | Age: 58
End: 2023-12-01

## 2023-12-01 ENCOUNTER — OFFICE VISIT (OUTPATIENT)
Dept: PEDIATRICS | Facility: CLINIC | Age: 58
End: 2023-12-01
Payer: COMMERCIAL

## 2023-12-01 VITALS
OXYGEN SATURATION: 98 % | BODY MASS INDEX: 33.89 KG/M2 | HEART RATE: 70 BPM | TEMPERATURE: 97.8 F | DIASTOLIC BLOOD PRESSURE: 82 MMHG | WEIGHT: 252 LBS | SYSTOLIC BLOOD PRESSURE: 117 MMHG

## 2023-12-01 DIAGNOSIS — S90.425A BLISTER (NONTHERMAL), LEFT LESSER TOE(S), INITIAL ENCOUNTER: ICD-10-CM

## 2023-12-01 DIAGNOSIS — L81.9 DISCOLORATION OF SKIN OF FOOT: ICD-10-CM

## 2023-12-01 DIAGNOSIS — R23.4 PEELING SKIN: ICD-10-CM

## 2023-12-01 DIAGNOSIS — L84 CALLUS OF FOOT: ICD-10-CM

## 2023-12-01 DIAGNOSIS — B35.1 ONYCHOMYCOSIS: ICD-10-CM

## 2023-12-01 DIAGNOSIS — B35.3 TINEA PEDIS OF BOTH FEET: ICD-10-CM

## 2023-12-01 DIAGNOSIS — R60.0 BILATERAL LEG EDEMA: Primary | ICD-10-CM

## 2023-12-01 PROCEDURE — 99214 OFFICE O/P EST MOD 30 MIN: CPT | Performed by: PHYSICIAN ASSISTANT

## 2023-12-01 RX ORDER — CEPHALEXIN 500 MG/1
500 CAPSULE ORAL 4 TIMES DAILY
Qty: 28 CAPSULE | Refills: 0 | Status: SHIPPED | OUTPATIENT
Start: 2023-12-01 | End: 2023-12-08

## 2023-12-01 NOTE — TELEPHONE ENCOUNTER
Advised patient of providers note/recommendations and rx     Patient preferred to stay with pcp- scheduled in next available.     Advised to see care for worsening symptoms over the weekend, no improvement call Monday  Future Appointments 12/1/2023 - 5/29/2024        Date Visit Type Length Department Provider     12/7/2023 10:20 AM OFFICE VISIT 20 min  FAMILY PRACTICE Lanre Mendoza MD    Location Instructions:     [facility] is located at [address] [place] [address], along [address] Free parking is available; access the lot by turning north from [address] onto [address], then west onto [facility] [facility].              1/2/2024 12:00 PM NEW VASCULAR PATIENT 60 min RH SURGICAL CONSULT Luca Ordonez MD    Location Instructions:     Our office is located at [address] in [place].We are just East of [facility] in the [facility].When entering the building,please take the elevator to the 3rd Floor.When exiting the elevator,go to the Left.&nbsp; The clinic will be the last door on the Right in [address].

## 2023-12-01 NOTE — TELEPHONE ENCOUNTER
Future Appointments   Date Time Provider Department Center   1/2/2024 12:00 PM Luca Ordonez MD SUR [facility]

## 2023-12-01 NOTE — RESULT ENCOUNTER NOTE
Dear Herman,    Here is a summary of your recent test results:  -Normal red blood cell (hgb) levels, normal white blood cell count and normal platelet levels.  -PSA (prostate specific antigen) test is normal.  This indicates a low likelihood of prostate cancer.  ADVISE: rechecking this in 1 year.  -Liver and gallbladder tests are normal (ALT,AST, Alk phos, bilirubin), kidney function is normal (Cr, GFR), sodium is normal, potassium is normal, calcium is normal, glucose is normal.  -Normal thyroid function.   -A1C (diabetic test) is normal and indicates that your blood sugar has been in a normal range the last 3 months.  -Lymes test is normal.   Your ESR test was normal.  This makes the likelihood of a serious infection or inflammatory condition unlikely.   -Antinuclear antibody labs are normal.    For additional lab test information, www.testing.com is a very good reference.    In addition, here is a list of due or overdue Health Maintenance reminders:  Cholesterol Lab due on 03/20/2018  Zoster (Shingles) Vaccine(2 of 2) due on 03/17/2020  Flu Vaccine(1) due on 09/01/2023    Please call us at [phone] (or use [facility]) to address the above recommendations if needed.           Thank you very much for trusting me and [facility].     Have a peaceful day.    Healthy regards,  Ricardo Mendoza MD

## 2023-12-01 NOTE — TELEPHONE ENCOUNTER
Called and talked to patient.     Advised of ads location and time.     Patient asked about labs.   Advised of lab result note from 11/29

## 2023-12-01 NOTE — TELEPHONE ENCOUNTER
"Nurse Triage SBAR    Is this a 2nd Level Triage? NO    Situation: Medication Question    Background: :Patient was prescribed Cephalexin and Lamisil on 11/21/2023    Assessment: Patient reports that he had received a different medication other than Lamisil, \"looks like a steroid\". Patient asked for clarification on dosage of cephalexin prescribed.    Confirmed that dosing of Cephalexin matches intended use to treat skin infection. Advised for him to bring medication to the pharmacy to show pharmacist to determine what the medication is that was given to patient.    Patient reports that he will bring medication to [facility] pharmacy.    No additional concerns or questions.    Mary Cole RN  12/01/23 5:23 PM  [facility] Nurse Advisor    Reason for Disposition   Caller has medicine question only, adult not sick, AND triager answers question    Additional Information   Negative: [1] Intentional drug overdose AND [2] suicidal thoughts or ideas   Negative: Drug overdose and triager unable to answer question   Negative: Caller requesting a renewal or refill of a medicine patient is currently taking   Negative: Caller requesting information unrelated to medicine   Negative: Caller requesting information about COVID-19 Vaccine   Negative: Caller requesting information about Emergency Contraception   Negative: Caller requesting information about Combined Birth Control Pills   Negative: Caller requesting information about Progestin Birth Control Pills   Negative: Caller requesting information about Post-Op pain or medicines   Negative: Caller requesting a prescription antibiotic (such as Penicillin) for Strep throat and has a positive culture result   Negative: Caller requesting a prescription anti-viral med (such as Tamiflu) and has influenza (flu) symptoms   Negative: Immunization reaction suspected   Negative: Rash while taking a medicine or within 3 days of stopping it   Negative: [1] Asthma and [2] having " symptoms of asthma (cough, wheezing, etc.)   Negative: [1] Symptom of illness (e.g., headache, abdominal pain, earache, vomiting) AND [2] more than mild   Negative: Breastfeeding questions about mother's medicines and diet   Negative: MORE THAN A DOUBLE DOSE of a prescription or over-the-counter (OTC) drug   Negative: [1] DOUBLE DOSE (an extra dose or lesser amount) of prescription drug AND [2] any symptoms (e.g., dizziness, nausea, pain, sleepiness)   Negative: [1] DOUBLE DOSE (an extra dose or lesser amount) of over-the-counter (OTC) drug AND [2] any symptoms (e.g., dizziness, nausea, pain, sleepiness)   Negative: Took another person's prescription drug   Negative: [1] DOUBLE DOSE (an extra dose or lesser amount) of prescription drug AND [2] NO symptoms  (Exception: A double dose of antibiotics.)   Negative: Diabetes drug error or overdose (e.g., took wrong type of insulin or took extra dose)   Negative: [1] Prescription not at pharmacy AND [2] was prescribed by PCP recently (Exception: Triager has access to EMR and prescription is recorded there. Go to Home Care and confirm for pharmacy.)   Negative: [1] Pharmacy calling with prescription question AND [2] triager unable to answer question   Negative: [1] Caller has URGENT medicine question about med that PCP or specialist prescribed AND [2] triager unable to answer question   Negative: Medicine patch causing local rash or itching   Negative: [1] Caller has medicine question about med NOT prescribed by PCP AND [2] triager unable to answer question (e.g., compatibility with other med, storage)   Negative: Prescription request for new medicine (not a refill)   Negative: [1] Caller has NON-URGENT medicine question about med that PCP prescribed AND [2] triager unable to answer question   Negative: Caller wants to use a complementary or alternative medicine   Negative: [1] Prescription prescribed recently is not at pharmacy AND [2] triager has access to patient's EMR  AND [3] prescription is recorded in the EMR   Negative: [1] DOUBLE DOSE (an extra dose or lesser amount) of over-the-counter (OTC) drug AND [2] NO symptoms   Negative: [1] DOUBLE DOSE (an extra dose or lesser amount) of antibiotic drug AND [2] NO symptoms    Protocols used: Medication Question Call-A-AH

## 2023-12-01 NOTE — TELEPHONE ENCOUNTER
Patient calls back and states he feels ADS was a waist of time.    Cephalexin ordered,  vascular and ortho referral was placed. He has the phone # to schedule both ortho and vascular.     Patient wants to know if Dr. Mendoza has something more to add given his symptoms    [facility]     Patient states he was taking to much Lamisil- he needs more - he took 3 tablets for 3 days.     Patient declined to schedule a follow up next week at this point he wants to see how things. Go     [facility]     For itching- Aquaphor   oTC Claritin for itching.

## 2023-12-01 NOTE — TELEPHONE ENCOUNTER
Avoid salt, consider possible diuretics if swelling/edema is noted.  Continue antibiotics, antifungals and moisturization his skin can be helpful.  Follow-up next week recommended.  Elevation of legs and compression socks also discussed previously and should be doing when possible

## 2023-12-01 NOTE — TELEPHONE ENCOUNTER
Pt referred to [facility] by Eleonora Vernon PA-C for bilateral leg edema.    Pt needs to be scheduled for NEW VASCULAR PATIENT consult with Vascular Medicine.  Will route to scheduling to coordinate an appointment at next available.    Appt note: Ref by Eleonora Vernon PA-C for bilateral leg edema, notes in Epic.    Melissa Park, JUAN JOSEN, RN, CV-BC  [facility] [place]

## 2023-12-01 NOTE — TELEPHONE ENCOUNTER
ADS provider can see patient today at 11am    Called patient and had to leave a message. - advised to call back to confirm    No answer on work phone  Needs be given ADS address and reiterate 11am time     [facility]  [address] [place] [address]

## 2023-12-01 NOTE — TELEPHONE ENCOUNTER
"Situation   Patient calls concerned about his left foot swelling and redness.     Background   Came in Wednesday for labs   LOV 11/21  He completed keflex   No PE/DVT hx- dad did have a dvt- does not take blood thinners.     Assessment   Left Foot had a  blister between left great toe and the little toe   He said the \"left foot swelling is pretty bad\" - has worsened since last weekend.   Swelling is going up to left ankle today.    Can barley get left foot in shoe   He said his left foot is so tight that it is getting numb  \"left big toe is getting hard\"   Whole left foot and toes are red and warm - redness has been present for days  Foot does not hurt to walk on but feels so tight    Right foot also has mild swelling  Redness on top of right foot also- 3 inches- developed in the last 2-3 days.    He does not feel well, \"feels off the last couple days\"    No fever, some chills  Does not feel like his heart is racing.   No chest pain   No SOB  No headache    Recommendations   Advised will check with ADS and call him back, patient declined Er \" I will not sit there\" advised rational    he is available today and works in [place].   [phone]- work (1st)   Can leave a detailed voicemail.     Reason for Disposition   SEVERE swelling (e.g., swelling extends above knee, entire leg is swollen, weeping fluid)    Additional Information   Negative: Sounds like a life-threatening emergency to the triager   Negative: Chest pain   Negative: Followed an insect bite and has localized swelling (e.g., small area of puffy or swollen skin)   Negative: Followed a knee injury   Negative: Ankle or foot injury   Negative: Pregnant with leg swelling or edema   Negative: Difficulty breathing at rest   Negative: Entire foot is cool or blue in comparison to other side   Commented on: Fever and red area (or area very tender to touch)     Redness on left foot    Protocols used: Leg Swelling and Edema-A-OH    "

## 2023-12-01 NOTE — PATIENT INSTRUCTIONS
Lamisil instructions: Take 1 tablet (250 mg) by mouth daily x 30days then hold 1 month then 30days     We will do another course of Keflex.     Follow up with podiatry (foot doctor/ortho) & vascular specialist.
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
## 2023-12-07 ENCOUNTER — OFFICE VISIT (OUTPATIENT)
Dept: FAMILY MEDICINE | Facility: CLINIC | Age: 58
End: 2023-12-07
Payer: COMMERCIAL

## 2023-12-07 VITALS
DIASTOLIC BLOOD PRESSURE: 88 MMHG | RESPIRATION RATE: 16 BRPM | HEIGHT: 75 IN | OXYGEN SATURATION: 100 % | BODY MASS INDEX: 31.21 KG/M2 | SYSTOLIC BLOOD PRESSURE: 130 MMHG | TEMPERATURE: 97.8 F | HEART RATE: 80 BPM | WEIGHT: 251 LBS

## 2023-12-07 DIAGNOSIS — Z13.220 SCREENING FOR HYPERLIPIDEMIA: ICD-10-CM

## 2023-12-07 DIAGNOSIS — R60.0 BILATERAL LEG EDEMA: Primary | ICD-10-CM

## 2023-12-07 DIAGNOSIS — R21 RASH: ICD-10-CM

## 2023-12-07 DIAGNOSIS — B35.1 ONYCHOMYCOSIS: ICD-10-CM

## 2023-12-07 DIAGNOSIS — B35.3 TINEA PEDIS OF BOTH FEET: ICD-10-CM

## 2023-12-07 PROCEDURE — 99213 OFFICE O/P EST LOW 20 MIN: CPT | Performed by: FAMILY MEDICINE

## 2023-12-07 RX ORDER — FLUOCINONIDE 0.5 MG/G
OINTMENT TOPICAL 2 TIMES DAILY
Qty: 60 G | Refills: 5 | Status: SHIPPED | OUTPATIENT
Start: 2023-12-07 | End: 2024-02-01

## 2023-12-07 NOTE — PROGRESS NOTES
"  Assessment & Plan   Bilateral leg edema  Improved, should avoid salt, continue with compressive socks which I think would be helpful, increase activity also be helpful.  No need for diuretics at this time.    Tinea pedis of both feet  Improving some compared to last exam, but does have some cracks underneath the feet due to thick calluses that are formed.  Continue to work on getting the calluses done as well as trying steroid ointment in these areas might help as well.    Onychomycosis  On terbinafine pulsed dose with 30 days on 3 days off 30 days and will continue for now.    Rash  Mainly located on the shins as well as the feet and steroid cream would be helpful to reduce the inflammation and itch and hopefully minimize any cuts that can lead to infection.  Previous cellulitis down the toes looks good today and should finish antibiotic course.  - fluocinonide (LIDEX) 0.05 % external ointment  Dispense: 60 g; Refill: 5        BMI:   Estimated body mass index is 31.37 kg/m  as calculated from the following:    Height as of this encounter: 1.905 m (6' 3\").    Weight as of this encounter: 113.9 kg (251 lb).   Weight management plan: Discussed healthy diet and exercise guidelines      Return in about 3 weeks (around 12/28/2023) for symptoms failing to improve or sooner if worsening.          Ricardo Mendoza MD      [address] [address]  [email or web address]   Office: [phone]       Saud Sutton is a 58 year old, presenting for the following health issues:  follow up swollen feet      History of Present Illness       Reason for visit:  Swollen feet (follow up both feet)  Symptom onset:  3-4 weeks ago  Symptoms include:  [place],  painful, bumps, itches  Symptom intensity:  Severe  Symptom progression:  Staying the same    He eats 0-1 servings of fruits and vegetables daily.He consumes 4 sweetened beverage(s) daily.He exercises with enough effort to increase " "his heart rate 9 or less minutes per day.  He is missing 1 dose(s) of medications per week.    His left foot is not as swollen as it was, but he is not sure if it is getting any better. It is up at the ankle and down. His legs are not cold, but they get red in color at times. He was put on cephalexin 4 times a day for cellulitis. He has tried lotion and cream but not regularly. He started taking oral Lamisil  He soaked his feet in Epsom salt about a month ago. The bottom of his toes have been dry and cracked. His girlfriend sanded his feet the night before. He scrubbed his feet in the shower. He is really itchy. He wrapped a large Band-Aid and walked around all day and it helped. He denies any trauma to his leg, knee surgery, or varicose veins. He works as a  and stands most of the day but he moves around a lot at work.    He does not have pain in his calf muscles when he walks a block or 2. He has been waking up in the morning for the last couple of years with pain. His right knee shifts and feels unstable at times, no significant swelling.. He describes it as a shock pain. He thinks it is bone on bone. His left knee does not bother him.       Review of Systems         Objective    /88   Pulse 80   Temp 97.8  F (36.6  C)   Resp 16   Ht 1.905 m (6' 3\")   Wt 113.9 kg (251 lb)   SpO2 100%   BMI 31.37 kg/m    Body mass index is 31.37 kg/m .  Physical Exam   GENERAL: healthy, alert and no distress  NECK: no adenopathy, no asymmetry, masses, or scars and thyroid normal to palpation  RESP: lungs clear to auscultation - no rales, rhonchi or wheezes  CV: regular rate and rhythm, normal S1 S2, no S3 or S4, no murmur, click or rub, no peripheral edema and peripheral pulses strong  ABDOMEN: soft, nontender, no hepatosplenomegaly, no masses and bowel sounds normal  MS: no gross musculoskeletal defects noted, no edema  SKIN: Dry flaking skin on both feet as well as around the ankles and some cracking " underneath the big toe on the left onycholysis of large toenails in particular otherwise no suspicious lesions or rashes  BACK: no CVA tenderness, no paralumbar tenderness

## 2024-01-30 ENCOUNTER — TELEPHONE (OUTPATIENT)
Dept: FAMILY MEDICINE | Facility: CLINIC | Age: 59
End: 2024-01-30
Payer: MEDICAID

## 2024-01-30 NOTE — TELEPHONE ENCOUNTER
Pt calling asking when he got the Xray of his knee -then he went to having numbness in his feet and ankles, then a rash, then toe fungus, then his 24 hours urine - RN had a hard time getting what pt wanted to do  - he had to put RN on hold several times due to he called while he was at work.     RN advised that it sounds like he has a lot going on and we get him scheduled for an appointment       Patient stated an understanding and agreed with plan. - made an OV for Thursday     Mary Ely RN, BSN  [facility] [place] Triage

## 2024-02-01 ENCOUNTER — OFFICE VISIT (OUTPATIENT)
Dept: FAMILY MEDICINE | Facility: CLINIC | Age: 59
End: 2024-02-01
Payer: COMMERCIAL

## 2024-02-01 ENCOUNTER — PATIENT OUTREACH (OUTPATIENT)
Dept: CARE COORDINATION | Facility: CLINIC | Age: 59
End: 2024-02-01

## 2024-02-01 VITALS
DIASTOLIC BLOOD PRESSURE: 60 MMHG | BODY MASS INDEX: 30.75 KG/M2 | HEART RATE: 77 BPM | OXYGEN SATURATION: 99 % | TEMPERATURE: 98.4 F | WEIGHT: 246 LBS | SYSTOLIC BLOOD PRESSURE: 136 MMHG

## 2024-02-01 DIAGNOSIS — L30.9 ECZEMA, UNSPECIFIED TYPE: ICD-10-CM

## 2024-02-01 DIAGNOSIS — M25.561 RIGHT KNEE PAIN, UNSPECIFIED CHRONICITY: Primary | ICD-10-CM

## 2024-02-01 DIAGNOSIS — B35.3 TINEA PEDIS OF BOTH FEET: ICD-10-CM

## 2024-02-01 DIAGNOSIS — B35.1 ONYCHOMYCOSIS: ICD-10-CM

## 2024-02-01 DIAGNOSIS — M17.10 ARTHRITIS OF KNEE: ICD-10-CM

## 2024-02-01 PROCEDURE — 99214 OFFICE O/P EST MOD 30 MIN: CPT | Performed by: FAMILY MEDICINE

## 2024-02-01 RX ORDER — BETAMETHASONE DIPROPIONATE 0.05 %
OINTMENT (GRAM) TOPICAL 2 TIMES DAILY
Qty: 45 G | Refills: 3 | Status: SHIPPED | OUTPATIENT
Start: 2024-02-01 | End: 2024-09-03

## 2024-02-01 RX ORDER — NAPROXEN SODIUM 220 MG
440 TABLET ORAL 2 TIMES DAILY WITH MEALS
COMMUNITY
Start: 2024-02-01 | End: 2024-02-08

## 2024-02-01 NOTE — PROGRESS NOTES
Assessment & Plan   Right knee pain, unspecified chronicity  Arthritis of knee  Patient concerned about gout, less likely, will check uric acid levels.  Pain is better than it was a couple weeks ago.  This have history of abnormal knee x-ray that showed signs of arthritis.  Exam did not show any laxity today nor tenderness of the meniscus with maneuvers but still could be a possibility as he gets occasional sharp pains.  Did try some of his friends indomethacin which she felt helped.  Could use over-the-counter Aleve to 20 mg 2 tabs twice daily as needed make sure to take with food and water and watch for any signs of stomach upset/gastritis.  - Uric acid  - naproxen sodium (ALEVE) 220 MG tablet    Eczema, unspecified type  Significant excoriations on the lower legs as well as around the Achilles tendon on the left greater than right.  Dryness around the toes as well and recommended trying a ointment and good skin creams such as Lubriderm, Cetaphil, CeraVe etc.  - betamethasone dipropionate (DIPROSONE) 0.05 % external ointment  Dispense: 45 g; Refill: 3    Onychomycosis  Tinea pedis of both feet  Ongoing changes, took terbinafine for about 30 days although he thinks he may be missed some of the days orally.  Will wait 30 days from ending that course and then take another 30 days to finish pulse therapy.  Final outcome will be determined in about 6 to 8 months.    Erectile dysfunction   Sildenafil is not real effective at 50 mg and did try taking 100 mg and that is when he woke up with a morning erection.  He had some questions regarding association of chronic rhinosinusitis and erections which I was not familiar with and he could talk with his ENT more on that.  Offered tadalafil but he was indecisive and for now will deal with his ENT issues.        No follow-ups on file.          Ricardo Mendoza MD      [address] [address]  [employer]   Office:  [phone]       Saud Sutton is a 58 year old, presenting for the following health issues:  Knee Pain and Derm Problem (Foot - Fungus/Ezema)    History of Present Illness       Reason for visit:  Recheck    He eats 2-3 servings of fruits and vegetables daily.He exercises with enough effort to increase his heart rate 9 or less minutes per day.    He is taking medications regularly.     Knee Pain: Right x clicking sound x 2 months, swelling x couple days (over use, just moved)    Derm Problem: Foot - Fungus/Ezema - Bilateral        Objective    /60 (BP Location: Left arm, Patient Position: Sitting, Cuff Size: Adult Large)   Pulse 77   Temp 98.4  F (36.9  C) (Tympanic)   Wt 111.6 kg (246 lb)   SpO2 99%   BMI 30.75 kg/m    Body mass index is 30.75 kg/m .  Physical Exam   GENERAL: alert and no distress  NECK: no adenopathy, no asymmetry, masses, or scars  RESP: lungs clear to auscultation - no rales, rhonchi or wheezes  CV: regular rate and rhythm, normal S1 S2, no S3 or S4, no murmur, click or rub, no peripheral edema  ABDOMEN: soft, nontender, no hepatosplenomegaly, no masses and bowel sounds normal  MS: no gross musculoskeletal defects noted, bilateral ankle trace to 1+ edema  SKIN: Thickened first and second nails bilaterally of his feet.  Surrounding thick skin with some scaling especially around the right first toe otherwise excoriations on the lower legs bilaterally and some mild dry skin, no suspicious lesions or rashes  NEURO: Normal strength and tone, mentation intact and speech normal  BACK: no CVA tenderness, no paralumbar tenderness  I spent a total of 37 minutes on the day of the visit.  -Doing chart review, history and exam, documentation and further activities as noted.          Signed Electronically by: Laner Mendoza MD

## 2024-02-15 ENCOUNTER — PATIENT OUTREACH (OUTPATIENT)
Dept: CARE COORDINATION | Facility: CLINIC | Age: 59
End: 2024-02-15
Payer: MEDICAID

## 2024-03-27 DIAGNOSIS — N52.9 ERECTILE DYSFUNCTION, UNSPECIFIED ERECTILE DYSFUNCTION TYPE: ICD-10-CM

## 2024-03-27 DIAGNOSIS — F90.2 ADHD (ATTENTION DEFICIT HYPERACTIVITY DISORDER), COMBINED TYPE: ICD-10-CM

## 2024-03-27 NOTE — LETTER
April 1, 2024      Herman Joseph  [address]  [place] [address]        Dear Dr. Reji Sutton was able to send your medication into your pharmacy, but wanted to advise you that you are due for your physical in the next 1 to 3 months. There was an attempt to call you at [phone], but the number is not in service. Please provide your up-to-date phone number. You can schedule your appointment through [employer], or feel free to give us a call at [phone]. If you have any questions or concerns, please do not hesitate to reach out. Have a good day!       Sincerely,  [facility] Care Team  On behalf of           Ricardo Mendoza M.D.

## 2024-03-28 ENCOUNTER — TELEPHONE (OUTPATIENT)
Dept: FAMILY MEDICINE | Facility: CLINIC | Age: 59
End: 2024-03-28
Payer: MEDICAID

## 2024-03-28 RX ORDER — DEXTROAMPHETAMINE SACCHARATE, AMPHETAMINE ASPARTATE MONOHYDRATE, DEXTROAMPHETAMINE SULFATE AND AMPHETAMINE SULFATE 3.75; 3.75; 3.75; 3.75 MG/1; MG/1; MG/1; MG/1
15 CAPSULE, EXTENDED RELEASE ORAL DAILY
Qty: 30 CAPSULE | Refills: 0 | Status: SHIPPED | OUTPATIENT
Start: 2024-03-28 | End: 2024-04-09

## 2024-03-28 RX ORDER — SILDENAFIL 50 MG/1
TABLET, FILM COATED ORAL
Qty: 30 TABLET | Refills: 0 | Status: SHIPPED | OUTPATIENT
Start: 2024-03-28 | End: 2024-05-03

## 2024-03-28 NOTE — TELEPHONE ENCOUNTER
Medication sent      Schedule wellness in the next 1 to 3 months.      Last visit in this dept:    2/1/2024     Last visit -this provider:  2/1/2024     Next visit in this dept:   Future Appointments 3/28/2024 - 9/24/2024      None            Health Maintenance   Topic Date Due    LIPID  03/20/2018    ZOSTER IMMUNIZATION (2 of 2) 03/17/2020    INFLUENZA VACCINE (1) 09/01/2023    ANNUAL REVIEW OF HM ORDERS  03/02/2024    YEARLY PREVENTIVE VISIT  03/02/2024    CMP  11/29/2024    PSA  11/29/2024    CBC  11/29/2024    COLORECTAL CANCER SCREENING  02/11/2026    GLUCOSE  11/29/2026    ADVANCE CARE PLANNING  01/14/2027    DTAP/TDAP/TD IMMUNIZATION (4 - Td or Tdap) 01/21/2030    HEPATITIS C SCREENING  Completed    PHQ-2 (once per calendar year)  Completed    Pneumococcal Vaccine: Pediatrics (0 to 5 Years) and At-Risk Patients (6 to 64 Years)  Aged Out    IPV IMMUNIZATION  Aged Out    HPV IMMUNIZATION  Aged Out    MENINGITIS IMMUNIZATION  Aged Out    RSV MONOCLONAL ANTIBODY  Aged Out    HIV SCREENING  Discontinued    HEPATITIS B IMMUNIZATION  Discontinued    COVID-19 Vaccine  Discontinued        CSA -- Patient Level:     [Media Unavailable] Controlled Substance Agreement - Non - Opioid - Scan on 2/24/2020  3:58 PM: NON-OPIOID CONTROLLED SUBSTANCE AGREEMENT

## 2024-03-28 NOTE — TELEPHONE ENCOUNTER
Medication Question or Refill        amphetamine-dextroamphetamine (ADDERALL XR) 15 MG 24 hr capsule       sildenafil (VIAGRA) 50 MG tablet       What medication are you calling about (include dose and sig)?:     Pt calling to pu his meds.  They are both in pending, but need to be approved and esent to pharmacy ([facility])    Preferred Pharmacy:     [id number] - [place] - [id number]  [address] [address] [place] [address]  Phone: [phone] Fax: [phone]      Controlled Substance Agreement on file:   CSA -- Patient Level:     [Media Unavailable] Controlled Substance Agreement - Non - Opioid - Scan on 2/24/2020  3:58 PM: NON-OPIOID CONTROLLED SUBSTANCE AGREEMENT       Who prescribed the medication?: Dr. Reji    Do you need a refill? Yes    When did you use the medication last? na    Patient offered an appointment? No    Do you have any questions or concerns?  No      Could we send this information to you in [facility] or would you prefer to receive a phone call?:   Patient would prefer a phone call   Okay to leave a detailed message?: Yes at Cell number on file:    Telephone Information:   Mobile [phone]     Charla PISANO

## 2024-03-29 NOTE — TELEPHONE ENCOUNTER
Placed call to patient but phone not in service - writer tried x2 with no avail. [employer] message sent.

## 2024-04-09 ENCOUNTER — NURSE TRIAGE (OUTPATIENT)
Dept: FAMILY MEDICINE | Facility: CLINIC | Age: 59
End: 2024-04-09

## 2024-04-09 ENCOUNTER — OFFICE VISIT (OUTPATIENT)
Dept: FAMILY MEDICINE | Facility: CLINIC | Age: 59
End: 2024-04-09
Payer: COMMERCIAL

## 2024-04-09 VITALS
WEIGHT: 244 LBS | HEART RATE: 78 BPM | DIASTOLIC BLOOD PRESSURE: 89 MMHG | TEMPERATURE: 98.5 F | SYSTOLIC BLOOD PRESSURE: 136 MMHG | OXYGEN SATURATION: 98 % | BODY MASS INDEX: 30.5 KG/M2

## 2024-04-09 DIAGNOSIS — F90.2 ADHD (ATTENTION DEFICIT HYPERACTIVITY DISORDER), COMBINED TYPE: ICD-10-CM

## 2024-04-09 DIAGNOSIS — N52.9 ERECTILE DYSFUNCTION, UNSPECIFIED ERECTILE DYSFUNCTION TYPE: ICD-10-CM

## 2024-04-09 DIAGNOSIS — M79.18 RHOMBOID MUSCLE PAIN: ICD-10-CM

## 2024-04-09 DIAGNOSIS — M54.2 NECK PAIN ON RIGHT SIDE: ICD-10-CM

## 2024-04-09 DIAGNOSIS — M54.9 UPPER BACK PAIN ON RIGHT SIDE: Primary | ICD-10-CM

## 2024-04-09 PROCEDURE — 99214 OFFICE O/P EST MOD 30 MIN: CPT | Performed by: FAMILY MEDICINE

## 2024-04-09 RX ORDER — DEXTROAMPHETAMINE SACCHARATE, AMPHETAMINE ASPARTATE MONOHYDRATE, DEXTROAMPHETAMINE SULFATE AND AMPHETAMINE SULFATE 3.75; 3.75; 3.75; 3.75 MG/1; MG/1; MG/1; MG/1
15 CAPSULE, EXTENDED RELEASE ORAL DAILY
Qty: 30 CAPSULE | Refills: 0 | Status: SHIPPED | OUTPATIENT
Start: 2024-05-27 | End: 2024-06-26

## 2024-04-09 RX ORDER — PREDNISONE 20 MG/1
TABLET ORAL
Qty: 20 TABLET | Refills: 0 | Status: SHIPPED | OUTPATIENT
Start: 2024-04-09 | End: 2024-04-21

## 2024-04-09 RX ORDER — DEXTROAMPHETAMINE SACCHARATE, AMPHETAMINE ASPARTATE MONOHYDRATE, DEXTROAMPHETAMINE SULFATE AND AMPHETAMINE SULFATE 3.75; 3.75; 3.75; 3.75 MG/1; MG/1; MG/1; MG/1
15 CAPSULE, EXTENDED RELEASE ORAL DAILY
Qty: 30 CAPSULE | Refills: 0 | Status: SHIPPED | OUTPATIENT
Start: 2024-04-27 | End: 2024-05-27

## 2024-04-09 RX ORDER — TADALAFIL 10 MG/1
10-20 TABLET ORAL DAILY PRN
Qty: 30 TABLET | Refills: 11 | Status: SHIPPED | OUTPATIENT
Start: 2024-04-09 | End: 2024-09-03 | Stop reason: ALTCHOICE

## 2024-04-09 RX ORDER — CYCLOBENZAPRINE HCL 5 MG
5-10 TABLET ORAL 3 TIMES DAILY PRN
Qty: 25 TABLET | Refills: 1 | Status: SHIPPED | OUTPATIENT
Start: 2024-04-09

## 2024-04-09 RX ORDER — DEXTROAMPHETAMINE SACCHARATE, AMPHETAMINE ASPARTATE MONOHYDRATE, DEXTROAMPHETAMINE SULFATE AND AMPHETAMINE SULFATE 3.75; 3.75; 3.75; 3.75 MG/1; MG/1; MG/1; MG/1
15 CAPSULE, EXTENDED RELEASE ORAL DAILY
Qty: 30 CAPSULE | Refills: 0 | Status: SHIPPED | OUTPATIENT
Start: 2024-06-26 | End: 2024-06-20

## 2024-04-09 ASSESSMENT — PAIN SCALES - PAIN ENJOYMENT GENERAL ACTIVITY SCALE (PEG)
INTERFERED_GENERAL_ACTIVITY: 10
PEG_TOTALSCORE: 9.67
PEG_TOTALSCORE: 9.67
AVG_PAIN_PASTWEEK: 9
INTERFERED_ENJOYMENT_LIFE: 10 - COMPLETELY INTERFERES
AVG_PAIN_PASTWEEK: 9
INTERFERED_ENJOYMENT_LIFE: 10
INTERFERED_GENERAL_ACTIVITY: 10 - COMPLETELY INTERFERES

## 2024-04-09 ASSESSMENT — ENCOUNTER SYMPTOMS: BACK PAIN: 1
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
## 2024-04-09 NOTE — TELEPHONE ENCOUNTER
Called and spoke with patient.      Scheduled     Next 5 appointments (look out 90 days)      Apr 09, 2024  2:40 PM  (Arrive by 2:20 PM)  Provider Visit with Lanre Mendoza MD  [facility] ([facility] ) [address] [place] [id number]  [phone]           JOHANN GREGORY RN on 4/9/2024 at 12:29 PM   [facility]

## 2024-04-17 NOTE — PROGRESS NOTES
"  {PROVIDER CHARTING PREFERENCE:934588}    Saud Sutton is a 58 year old, presenting for the following health issues:  No chief complaint on file.  {(!) Visit Details have not yet been documented.  Please enter Visit Details and then use this list to pull in documentation. (Optional):051204}    HPI     Musculoskeletal problem/pain-- Swollen Left foot  Onset/Duration: Toe has been \"hard & scaly\" for quit a while, but the swelling has been at least a week. Did have blisters present between the big toe/first 2 toes. PCP gave cream for fungus that he has been using.   Description:       Location: Left foot       Joint swelling: no        Redness: no        Pain: \"Feels tight & sore\"       Warmth: YES  Progression of symptoms worse  Accompanying signs and symptoms:       Fevers: no        Numbness/tingling/weakness: YES- in between left toes and top of foot  History        Trauma to the area: YES- Was working in garage 1-2 weeks ago and stepped on a nail, does not remember which foot and does not remember if it punctured the skin         Previous history of Gout: no         Recent illness: no         Previous similar problem: no         Previous evaluation: no   Aggravating factors include: standing, walking, and overuse  Therapies tried and outcome: rest/inactivity and Ibuprofen  Have you had any surgeries on your arteries of veins: No        Review of Systems   {ROS COMP (Optional):399388}      Objective    There were no vitals taken for this visit.  There is no height or weight on file to calculate BMI.  Physical Exam   {Exam List (Optional):317722}    {Diagnostic Test Results (Optional):421752}    {AMBULATORY ATTESTATION (Optional):568810}              " 17-Apr-2024 15:18

## 2024-05-03 DIAGNOSIS — N52.9 ERECTILE DYSFUNCTION, UNSPECIFIED ERECTILE DYSFUNCTION TYPE: ICD-10-CM

## 2024-05-03 RX ORDER — SILDENAFIL 50 MG/1
50 TABLET, FILM COATED ORAL DAILY PRN
Qty: 30 TABLET | Refills: 2 | Status: SHIPPED | OUTPATIENT
Start: 2024-05-03 | End: 2024-09-03

## 2024-05-03 NOTE — TELEPHONE ENCOUNTER
Patient wants to switch back to sildenafil.   Gets headaches and stuffy nose from cialis.     Routing to provider to review and advise.     Yaneth Leslie RN  [facility]

## 2024-05-10 ENCOUNTER — TELEPHONE (OUTPATIENT)
Dept: FAMILY MEDICINE | Facility: CLINIC | Age: 59
End: 2024-05-10
Payer: COMMERCIAL

## 2024-05-10 DIAGNOSIS — J34.9 SINUS PROBLEM: Primary | ICD-10-CM

## 2024-05-10 NOTE — TELEPHONE ENCOUNTER
Patient calls.     States he discussed his sinuses with Dr. Mendoza at 4/9 office visit. Patient asking for ENT referral to [facility] in [place]    Routing to provider to review and advise. Route back to RV triage once done or if other recommendation.     Yaneth Leslie RN  [facility]

## 2024-05-10 NOTE — TELEPHONE ENCOUNTER
He should be okay to call [facility]   [address]   [facility] [address]   Phone: [phone]

## 2024-05-25 ENCOUNTER — HEALTH MAINTENANCE LETTER (OUTPATIENT)
Age: 59
End: 2024-05-25

## 2024-06-20 ENCOUNTER — MYC MEDICAL ADVICE (OUTPATIENT)
Dept: FAMILY MEDICINE | Facility: CLINIC | Age: 59
End: 2024-06-20
Payer: COMMERCIAL

## 2024-06-20 ENCOUNTER — TELEPHONE (OUTPATIENT)
Dept: FAMILY MEDICINE | Facility: CLINIC | Age: 59
End: 2024-06-20
Payer: COMMERCIAL

## 2024-06-20 DIAGNOSIS — F90.2 ADHD (ATTENTION DEFICIT HYPERACTIVITY DISORDER), COMBINED TYPE: ICD-10-CM

## 2024-06-20 RX ORDER — DEXTROAMPHETAMINE SACCHARATE, AMPHETAMINE ASPARTATE MONOHYDRATE, DEXTROAMPHETAMINE SULFATE AND AMPHETAMINE SULFATE 3.75; 3.75; 3.75; 3.75 MG/1; MG/1; MG/1; MG/1
15 CAPSULE, EXTENDED RELEASE ORAL DAILY
Qty: 30 CAPSULE | Refills: 0 | Status: SHIPPED | OUTPATIENT
Start: 2024-06-26 | End: 2024-06-21

## 2024-06-20 NOTE — TELEPHONE ENCOUNTER
Medication Question    Contacts       Contact Date/Time Type Contact Phone/Fax    06/20/2024 01:35 PM CDT Phone (Incoming) Herman Joseph (Self) [phone] (M)            What medication are you calling about (include dose and sig)?: 6/26/24 Adderall 15mg rx    Preferred Pharmacy:    [id number]  [place] - [address]  [address] [place] [address]  Phone: [phone] Fax: [phone]      Controlled Substance Agreement on file:   CSA -- Patient Level:     [Media Unavailable] Controlled Substance Agreement - Non - Opioid - Scan on 2/24/2020  3:58 PM: NON-OPIOID CONTROLLED SUBSTANCE AGREEMENT       Who prescribed the medication?: PCP    Do you need a refill? No    When did you use the medication last? today    Patient offered an appointment? No    Do you have any questions or concerns?  Yes: requesting early  due to going out of town this Saturday for family vacation for 12 days      Could we send this information to you in [facility] or would you prefer to receive a phone call?:   Patient would prefer a phone call   Okay to leave a detailed message?: No at Cell number on file:    Telephone Information:   Mobile [phone]

## 2024-06-21 DIAGNOSIS — F90.2 ADHD (ATTENTION DEFICIT HYPERACTIVITY DISORDER), COMBINED TYPE: ICD-10-CM

## 2024-06-21 RX ORDER — DEXTROAMPHETAMINE SACCHARATE, AMPHETAMINE ASPARTATE MONOHYDRATE, DEXTROAMPHETAMINE SULFATE AND AMPHETAMINE SULFATE 3.75; 3.75; 3.75; 3.75 MG/1; MG/1; MG/1; MG/1
15 CAPSULE, EXTENDED RELEASE ORAL DAILY
Qty: 30 CAPSULE | Refills: 0 | Status: SHIPPED | OUTPATIENT
Start: 2024-06-26 | End: 2024-09-03

## 2024-06-21 NOTE — TELEPHONE ENCOUNTER
Patient called back and states he needs the note on the Adderall to state can  6/21.  he is leaving out of town today not tomorrow.     Patient states he can not get into [facility] gave [employer] help desk [phone]    Patient would like a call back when done [phone]  Can leave a detailed voicemail.     [employer] message sent and routed.

## 2024-06-26 RX ORDER — DEXTROAMPHETAMINE SACCHARATE, AMPHETAMINE ASPARTATE MONOHYDRATE, DEXTROAMPHETAMINE SULFATE AND AMPHETAMINE SULFATE 3.75; 3.75; 3.75; 3.75 MG/1; MG/1; MG/1; MG/1
15 CAPSULE, EXTENDED RELEASE ORAL DAILY
Qty: 30 CAPSULE | Refills: 0 | Status: SHIPPED | OUTPATIENT
Start: 2024-06-26 | End: 2024-09-03

## 2024-06-26 NOTE — TELEPHONE ENCOUNTER
Patient calls to state he was unable to  prescription prior to leaving [facility]. Patient wonders if we can send prescription to a different pharmacy in [place].    This writer contacted [facility] and verified that the patient did NOT  this prescription.    Please send new prescription to [facility] in [place] for Adderral XR 15 mg tablets 1x daily so patient does not run out on his vacation.

## 2024-08-29 ENCOUNTER — PATIENT OUTREACH (OUTPATIENT)
Dept: CARE COORDINATION | Facility: CLINIC | Age: 59
End: 2024-08-29
Payer: COMMERCIAL

## 2024-09-03 ENCOUNTER — TELEPHONE (OUTPATIENT)
Dept: FAMILY MEDICINE | Facility: CLINIC | Age: 59
End: 2024-09-03

## 2024-09-03 ENCOUNTER — OFFICE VISIT (OUTPATIENT)
Dept: FAMILY MEDICINE | Facility: CLINIC | Age: 59
End: 2024-09-03
Payer: COMMERCIAL

## 2024-09-03 VITALS
HEIGHT: 75 IN | OXYGEN SATURATION: 98 % | WEIGHT: 239 LBS | RESPIRATION RATE: 16 BRPM | DIASTOLIC BLOOD PRESSURE: 84 MMHG | SYSTOLIC BLOOD PRESSURE: 138 MMHG | TEMPERATURE: 98 F | BODY MASS INDEX: 29.72 KG/M2 | HEART RATE: 77 BPM

## 2024-09-03 DIAGNOSIS — F90.2 ADHD (ATTENTION DEFICIT HYPERACTIVITY DISORDER), COMBINED TYPE: ICD-10-CM

## 2024-09-03 DIAGNOSIS — B35.6 TINEA CRURIS: ICD-10-CM

## 2024-09-03 DIAGNOSIS — M25.512 LEFT SHOULDER PAIN, UNSPECIFIED CHRONICITY: Primary | ICD-10-CM

## 2024-09-03 DIAGNOSIS — B00.9 RECURRENT HERPES SIMPLEX: ICD-10-CM

## 2024-09-03 DIAGNOSIS — L30.9 DERMATITIS: ICD-10-CM

## 2024-09-03 DIAGNOSIS — K62.89 HYPERTROPHY, ANAL PAPILLAE: ICD-10-CM

## 2024-09-03 DIAGNOSIS — N52.9 ERECTILE DYSFUNCTION, UNSPECIFIED ERECTILE DYSFUNCTION TYPE: ICD-10-CM

## 2024-09-03 PROCEDURE — 99214 OFFICE O/P EST MOD 30 MIN: CPT | Mod: 25 | Performed by: FAMILY MEDICINE

## 2024-09-03 PROCEDURE — 17110 DESTRUCTION B9 LES UP TO 14: CPT | Performed by: FAMILY MEDICINE

## 2024-09-03 RX ORDER — SILDENAFIL 100 MG/1
100 TABLET, FILM COATED ORAL DAILY PRN
Qty: 30 TABLET | Refills: 5 | Status: SHIPPED | OUTPATIENT
Start: 2024-09-03

## 2024-09-03 RX ORDER — BETAMETHASONE DIPROPIONATE 0.5 MG/G
CREAM TOPICAL 2 TIMES DAILY
Qty: 45 G | Refills: 2 | Status: SHIPPED | OUTPATIENT
Start: 2024-09-03

## 2024-09-03 RX ORDER — TERBINAFINE HYDROCHLORIDE 250 MG/1
250 TABLET ORAL DAILY
Qty: 60 TABLET | Refills: 0 | Status: SHIPPED | OUTPATIENT
Start: 2024-09-03

## 2024-09-03 RX ORDER — VALACYCLOVIR HYDROCHLORIDE 500 MG/1
500 TABLET, FILM COATED ORAL DAILY
Qty: 90 TABLET | Refills: 4 | Status: SHIPPED | OUTPATIENT
Start: 2024-09-03

## 2024-09-03 RX ORDER — DEXTROAMPHETAMINE SACCHARATE, AMPHETAMINE ASPARTATE MONOHYDRATE, DEXTROAMPHETAMINE SULFATE AND AMPHETAMINE SULFATE 5; 5; 5; 5 MG/1; MG/1; MG/1; MG/1
20 CAPSULE, EXTENDED RELEASE ORAL DAILY
Qty: 30 CAPSULE | Refills: 0 | Status: SHIPPED | OUTPATIENT
Start: 2024-11-26

## 2024-09-03 RX ORDER — DEXTROAMPHETAMINE SACCHARATE, AMPHETAMINE ASPARTATE MONOHYDRATE, DEXTROAMPHETAMINE SULFATE AND AMPHETAMINE SULFATE 5; 5; 5; 5 MG/1; MG/1; MG/1; MG/1
20 CAPSULE, EXTENDED RELEASE ORAL DAILY
Qty: 30 CAPSULE | Refills: 0 | Status: SHIPPED | OUTPATIENT
Start: 2024-09-27 | End: 2024-10-27

## 2024-09-03 RX ORDER — DEXTROAMPHETAMINE SACCHARATE, AMPHETAMINE ASPARTATE, DEXTROAMPHETAMINE SULFATE AND AMPHETAMINE SULFATE 2.5; 2.5; 2.5; 2.5 MG/1; MG/1; MG/1; MG/1
10 TABLET ORAL DAILY PRN
Qty: 30 TABLET | Refills: 0 | Status: SHIPPED | OUTPATIENT
Start: 2024-09-03

## 2024-09-03 RX ORDER — DEXTROAMPHETAMINE SACCHARATE, AMPHETAMINE ASPARTATE MONOHYDRATE, DEXTROAMPHETAMINE SULFATE AND AMPHETAMINE SULFATE 5; 5; 5; 5 MG/1; MG/1; MG/1; MG/1
20 CAPSULE, EXTENDED RELEASE ORAL DAILY
Qty: 30 CAPSULE | Refills: 0 | Status: SHIPPED | OUTPATIENT
Start: 2024-10-27 | End: 2024-10-04

## 2024-09-03 NOTE — TELEPHONE ENCOUNTER
Epic message from provider- ok to use.   Arrival time 3pm     Called patient. Advised of 3pm arrival time today for a 320pm apt.    Explained can set up physical apt when here in the clinic      Future Appointments 9/3/2024 - 3/2/2025        Date Visit Type Length Department Provider     9/3/2024  3:20 PM OFFICE VISIT 20 min [facility] Lanre Mendoza MD    Location Instructions:     [facility] is located at [address] [place] [address], along [address]. Free parking is available; access the lot by turning north from [address] onto [address], then west onto [address] [facility] [address].

## 2024-09-03 NOTE — TELEPHONE ENCOUNTER
Patient requesting a same day appointment for a physical or to be seen for a skin tag, ongoing shoulder pain and potential dosage change for Adderall.     Patient states he has been up at his cabin but is traveling to [place] today for an ENT appointment at 2pm, so was hoping to get in.     Patient is willing to see Dr. Mendoza for others concerns and schedule physical later.     Can we use your 3 or 330 virtual for in person?     3/2/23 - physical     Can leave a detailed voicemail. [phone]    Routing and sent epic message

## 2024-09-03 NOTE — PROGRESS NOTES
Assessment & Plan   Left shoulder pain, unspecified chronicity  Exam most consistent with supraspinatus strain, negative empty can test but does have some soreness acros s the body of the supraspinatus.  Good range of motion of the shoulder except internal rotation and work on that-demonstrated rotator cuff exercises.    ADHD (attention deficit hyperactivity disorder), combined type  partial control, will increase Adderall dose to from 15 to 20 mg of the extended release and add a 5-10 mg dose to try in the early afternoon for the next month as he just filled the 15 mg   - amphetamine-dextroamphetamine (ADDERALL) 10 MG tablet  Dispense: 30 tablet; Refill: 0  - amphetamine-dextroamphetamine (ADDERALL XR) 20 MG 24 hr capsule  Dispense: 30 capsule; Refill: 0  - amphetamine-dextroamphetamine (ADDERALL XR) 20 MG 24 hr capsule  Dispense: 30 capsule; Refill: 0  - amphetamine-dextroamphetamine (ADDERALL XR) 20 MG 24 hr capsule  Dispense: 30 capsule; Refill: 0    Erectile dysfunction, unspecified erectile dysfunction type  Stable - continue medication(s).  Usually takes 100 mg of will increase dose.  - sildenafil (VIAGRA) 100 MG tablet  Dispense: 30 tablet; Refill: 5    Recurrent herpes simplex  Intermittent lesions on the shaft of the penis and he like to try suppressive therapy:  - valACYclovir (VALTREX) 500 MG tablet  Dispense: 90 tablet; Refill: 4    Tinea cruris  Signs of tinea rash on the scrotum and inner thighs after he did a lot of sweating while doing labor in the heat.  Could use topical Lamisil cream but treating onychomycosis and this should help:  - terbinafine (LAMISIL) 250 MG tablet  Dispense: 60 tablet; Refill: 0    Dermatitis  Dry patches behind the knees in particular as well as other parts of the body that he can use this twice daily as needed.  - betamethasone dipropionate (DIPROSONE) 0.05 % external cream  Dispense: 45 g; Refill: 2    Hypertrophy, anal papillae  Right side has a 1 x 1 cm anal skin tag  "that is about 8 mm at the base and treated with cryotherapy x 3 cycles.  - DESTRUCT BENIGN LESION, UP TO 14    No follow-ups on file.   Follow-up Visit   Expected date:  Dec 03, 2024 (Approximate)      Follow Up Appointment Details:     Follow-up with whom?: Me    Follow-Up for what?: Adult Preventive    Any Additional Chronic Condition Management?: General (Other)    Additional Details: ADHD    How?: In Person    Is this an as-needed follow-up?: No                         Ricardo Mendoza MD      [address] [address]  [employer]   Office: [phone]         Suad Sutton is a 59 year old, presenting for the following health issues:  Skin Tags, Shoulder Pain (ongoing), and Recheck Medication (Adderall dosage change )    History of Present Illness       Reason for visit:  General   He is taking medications regularly.     Skin tag-has one on his anus and wondering if he should have it removed. Does not bother him except it is a larger one.     Shoulder pain the last week with increased activity.     Where is your back pain located? (Select all that apply) shoulders left  How would you describe your back pain?  sharp  Where does your back pain spread? the left neck  Since your last clinic visit for back pain, how has your pain changed? always present, but gets better and worse  Does your back pain interfere with your job? YES  Since your last visit, have you tried any new treatment? No    Medication Followup of ADHD on amphetamine-dextroamphetamine (ADDERALL XR) 15 MG 24 hr capsule   Taking Medication as prescribed: yes  Side Effects:  None  Medication Helping Symptoms:  Is currently doing the longer release-wondering if he needs to up the dose or take it twice daily. Gets very sleepy around 5 pm every day.         Objective    /84   Pulse 77   Temp 98  F (36.7  C) (Tympanic)   Resp 16   Ht 1.905 m (6' 3\")   Wt 108.4 kg (239 lb)   SpO2 98%   " BMI 29.87 kg/m    Body mass index is 29.87 kg/m .  Physical Exam   GENERAL: alert and no distress  NECK: no adenopathy, no asymmetry, masses, or scars  RESP: lungs clear to auscultation - no rales, rhonchi or wheezes  CV: regular rate and rhythm, normal S1 S2, no S3 or S4, no murmur, click or rub, no peripheral edema  ABDOMEN: soft, nontender, no hepatosplenomegaly, no masses and bowel sounds normal  RECTAL (male): normal sphincter tone, 1 x 1 cm pedunculated skin tag/papule that is about 8 mm at the base -treated with 3 rounds of cryotherapy  MS: no gross musculoskeletal defects noted, no edema  SKIN: no suspicious lesions or rashes        Signed Electronically by: Lanre Mendoza MD

## 2024-10-04 ENCOUNTER — TELEPHONE (OUTPATIENT)
Dept: FAMILY MEDICINE | Facility: CLINIC | Age: 59
End: 2024-10-04
Payer: COMMERCIAL

## 2024-10-04 DIAGNOSIS — F90.2 ADHD (ATTENTION DEFICIT HYPERACTIVITY DISORDER), COMBINED TYPE: ICD-10-CM

## 2024-10-04 NOTE — TELEPHONE ENCOUNTER
Pt is calling for update. Needs Adderall 20mg XR sent to [facility] due to [facility] being out of stock. Pt will be going out of town. Please advise.

## 2024-10-04 NOTE — TELEPHONE ENCOUNTER
Medication Question or Refill    [facility] IS ON BACK ORDER & out of medication    [facility] has in stock - please resend or print a paper Rx    Pt wants to pu his amphetamine-dextroamphetamine (ADDERALL XR) 20 MG 24 hr capsule (Future) [facility] is out and on back order.  [facility] has in stock - see new location and please order for pu today as pt is leaving for cabin and needs before he goes.    Amphetamine-dextroamphetamine (ADDERALL XR) 20 MG   24 hr capsule (Future)    URGENT *    What medication are you calling about (include dose and sig)?:     Preferred Pharmacy:       [id number] - [facility]   [address]   [facility] [address]   [phone]     Controlled Substance Agreement on file:   CSA -- Patient Level:     [Media Unavailable] Controlled Substance Agreement - Non - Opioid - Scan on 2/24/2020  3:58 PM: NON-OPIOID CONTROLLED SUBSTANCE AGREEMENT       Who prescribed the medication?: Dr. Mendoza    Do you need a refill? Yes - URGENT    When did you use the medication last? NA    Patient offered an appointment? No    Do you have any questions or concerns?  No      Could we send this information to you in [employer] or would you prefer to receive a phone call?:         [facility] is out and on back order    [facility] [address] has medication in house    amphetamine-dextroamphetamine (ADDERALL XR) 20 MG 24 hr capsule (Future)

## 2024-10-06 RX ORDER — DEXTROAMPHETAMINE SACCHARATE, AMPHETAMINE ASPARTATE MONOHYDRATE, DEXTROAMPHETAMINE SULFATE AND AMPHETAMINE SULFATE 5; 5; 5; 5 MG/1; MG/1; MG/1; MG/1
20 CAPSULE, EXTENDED RELEASE ORAL DAILY
Qty: 30 CAPSULE | Refills: 0 | Status: SHIPPED | OUTPATIENT
Start: 2024-10-27

## 2024-10-07 NOTE — TELEPHONE ENCOUNTER
I myself can not follow the disc and the new ones!  Can I ask for help.    The correct pharmacy is [facility],  Not [facility].  Please review and tell me if I can call the patient and state the new script is at the pharmacy please.    Trina Kerr

## 2024-10-07 NOTE — TELEPHONE ENCOUNTER
Attempt #1  Called Phone # [phone]    Left a non detailed voicemail to please call back and ask for any available triage nurse regarding  @ [phone].     Adderall rx start date 9/27- 10/27 was sent to [facility] ( note states out of stock)    Adderall with start date of 10/27 was sent to [facility] (correct pharmacy)     Start date of 11/26 was sent to [facility] ( does patient want this also sent to [facility] ?)    Called to clarify if patient was able to  9/27 start date  If not will have to be sent to provider to change to [facility]     Does patient want to keep started date of 11/26 at [facility]?

## 2024-10-11 RX ORDER — DEXTROAMPHETAMINE SACCHARATE, AMPHETAMINE ASPARTATE, DEXTROAMPHETAMINE SULFATE AND AMPHETAMINE SULFATE 2.5; 2.5; 2.5; 2.5 MG/1; MG/1; MG/1; MG/1
10 TABLET ORAL DAILY PRN
Qty: 30 TABLET | Refills: 0 | Status: SHIPPED | OUTPATIENT
Start: 2024-10-11

## 2024-10-11 NOTE — TELEPHONE ENCOUNTER
Patient calling back to report that he has not been able to  9/27/24 Adderall rx.    Reports pharmacies have not been able to get Adderall 20mg XR in stock anywhere. Patient does not want to keep looking for other pharmacies to see if rx is in stock.    Patient okay with taking Adderall 15mg XR. Would like Adderall 15mg XR sent to [facility] in [place].     Writer advised patient to check to see if they have rx in stock - and call clinic back to request. Pharmacy desired attached.     When patient calls back please inquire   Does patient want to keep started date of 11/26 at [facility]?

## 2024-10-11 NOTE — TELEPHONE ENCOUNTER
Left message for patient stating medication was sent.     JOHANN GREGORY RN on 10/11/2024 at 12:36 PM   [facility]

## 2024-10-11 NOTE — TELEPHONE ENCOUNTER
Routing to provider to review and advise.     Wants the 10 mg IR sent to [facility]    Call back once signed.     JOHANN GREGORY RN on 10/11/2024 at 11:11 AM   [facility]

## 2024-10-21 ENCOUNTER — HOSPITAL ENCOUNTER (EMERGENCY)
Facility: CLINIC | Age: 59
Discharge: HOME | End: 2024-10-21
Payer: COMMERCIAL

## 2024-10-21 ENCOUNTER — HOSPITAL ENCOUNTER (EMERGENCY)
Facility: CLINIC | Age: 59
Discharge: HOME | End: 2024-10-22
Attending: FAMILY MEDICINE | Admitting: FAMILY MEDICINE
Payer: COMMERCIAL

## 2024-10-21 ENCOUNTER — APPOINTMENT (OUTPATIENT)
Dept: CT IMAGING | Facility: CLINIC | Age: 59
End: 2024-10-21
Payer: COMMERCIAL

## 2024-10-21 VITALS
DIASTOLIC BLOOD PRESSURE: 95 MMHG | OXYGEN SATURATION: 99 % | SYSTOLIC BLOOD PRESSURE: 149 MMHG | TEMPERATURE: 97.4 F | RESPIRATION RATE: 18 BRPM

## 2024-10-21 VITALS
TEMPERATURE: 97.1 F | BODY MASS INDEX: 49.08 KG/M2 | WEIGHT: 250 LBS | OXYGEN SATURATION: 96 % | DIASTOLIC BLOOD PRESSURE: 90 MMHG | HEART RATE: 115 BPM | SYSTOLIC BLOOD PRESSURE: 162 MMHG | HEIGHT: 60 IN | RESPIRATION RATE: 20 BRPM

## 2024-10-21 DIAGNOSIS — S51.012A ELBOW LACERATION, LEFT, INITIAL ENCOUNTER: ICD-10-CM
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
## 2024-10-21 PROCEDURE — 99284 EMERGENCY DEPT VISIT MOD MDM: CPT | Mod: 25 | Performed by: EMERGENCY MEDICINE

## 2024-10-21 PROCEDURE — 12002 RPR S/N/AX/GEN/TRNK2.6-7.5CM: CPT

## 2024-10-21 PROCEDURE — 99283 EMERGENCY DEPT VISIT LOW MDM: CPT | Performed by: FAMILY MEDICINE

## 2024-10-21 PROCEDURE — 12002 RPR S/N/AX/GEN/TRNK2.6-7.5CM: CPT | Performed by: EMERGENCY MEDICINE

## 2024-10-21 PROCEDURE — 250N000011 HC RX IP 250 OP 636

## 2024-10-21 PROCEDURE — 73200 CT UPPER EXTREMITY W/O DYE: CPT | Mod: LT

## 2024-10-21 PROCEDURE — 99285 EMERGENCY DEPT VISIT HI MDM: CPT | Mod: 25

## 2024-10-21 RX ORDER — ONDANSETRON 4 MG/1
4 TABLET, ORALLY DISINTEGRATING ORAL EVERY 8 HOURS PRN
Qty: 10 TABLET | Refills: 0 | Status: SHIPPED | OUTPATIENT
Start: 2024-10-21

## 2024-10-21 RX ORDER — CEPHALEXIN 500 MG/1
500 CAPSULE ORAL 3 TIMES DAILY
Qty: 15 CAPSULE | Refills: 0 | Status: SHIPPED | OUTPATIENT
Start: 2024-10-21 | End: 2024-10-24

## 2024-10-21 RX ORDER — CEPHALEXIN 500 MG/1
500 CAPSULE ORAL 3 TIMES DAILY
Qty: 15 CAPSULE | Refills: 0 | Status: SHIPPED | OUTPATIENT
Start: 2024-10-21 | End: 2024-10-21

## 2024-10-21 RX ORDER — OXYCODONE HYDROCHLORIDE 5 MG/1
5 TABLET ORAL EVERY 6 HOURS PRN
Qty: 6 TABLET | Refills: 0 | Status: SHIPPED | OUTPATIENT
Start: 2024-10-21 | End: 2024-10-24

## 2024-10-21 RX ORDER — CEFAZOLIN SODIUM 1 G/3ML
1 INJECTION, POWDER, FOR SOLUTION INTRAMUSCULAR; INTRAVENOUS ONCE
Status: COMPLETED | OUTPATIENT
Start: 2024-10-21 | End: 2024-10-21

## 2024-10-21 RX ADMIN — CEFAZOLIN 1 G: 1 INJECTION, POWDER, FOR SOLUTION INTRAMUSCULAR; INTRAVENOUS at 20:17

## 2024-10-21 ASSESSMENT — COLUMBIA-SUICIDE SEVERITY RATING SCALE - C-SSRS
1. IN THE PAST MONTH, HAVE YOU WISHED YOU WERE DEAD OR WISHED YOU COULD GO TO SLEEP AND NOT WAKE UP?: NO
1. IN THE PAST MONTH, HAVE YOU WISHED YOU WERE DEAD OR WISHED YOU COULD GO TO SLEEP AND NOT WAKE UP?: NO
2. HAVE YOU ACTUALLY HAD ANY THOUGHTS OF KILLING YOURSELF IN THE PAST MONTH?: NO
2. HAVE YOU ACTUALLY HAD ANY THOUGHTS OF KILLING YOURSELF IN THE PAST MONTH?: NO
6. HAVE YOU EVER DONE ANYTHING, STARTED TO DO ANYTHING, OR PREPARED TO DO ANYTHING TO END YOUR LIFE?: NO
6. HAVE YOU EVER DONE ANYTHING, STARTED TO DO ANYTHING, OR PREPARED TO DO ANYTHING TO END YOUR LIFE?: NO

## 2024-10-21 ASSESSMENT — ACTIVITIES OF DAILY LIVING (ADL)
ADLS_ACUITY_SCORE: 33
ADLS_ACUITY_SCORE: 35

## 2024-10-21 NOTE — ED TRIAGE NOTES
Patient was cutting tree down with friend, patient was pushing tree and chainsaw went through tree and cut back patient patient left arm.  Good CMS in arm and hand, pulses present and palpable. Patient Is NOT on blood thinners.     Triage Assessment (Adult)       Row Name 10/21/24 1523          Triage Assessment    Airway WDL WDL        Respiratory WDL    Respiratory WDL WDL        Skin Circulation/Temperature WDL    Skin Circulation/Temperature WDL WDL        Peripheral/Neurovascular WDL    Peripheral Neurovascular WDL WDL

## 2024-10-22 PROCEDURE — 250N000013 HC RX MED GY IP 250 OP 250 PS 637: Performed by: FAMILY MEDICINE

## 2024-10-22 RX ORDER — ACETAMINOPHEN 500 MG
1000 TABLET ORAL ONCE
Status: COMPLETED | OUTPATIENT
Start: 2024-10-22 | End: 2024-10-22

## 2024-10-22 RX ORDER — OXYCODONE HYDROCHLORIDE 5 MG/1
5-10 TABLET ORAL EVERY 6 HOURS PRN
Qty: 12 TABLET | Refills: 0 | Status: SHIPPED | OUTPATIENT
Start: 2024-10-22

## 2024-10-22 RX ADMIN — ACETAMINOPHEN 1000 MG: 500 TABLET ORAL at 00:27

## 2024-10-22 ASSESSMENT — ACTIVITIES OF DAILY LIVING (ADL): ADLS_ACUITY_SCORE: 35

## 2024-10-22 NOTE — ED PROVIDER NOTES
History     Chief Complaint   Patient presents with    Elbow Injury     HPI    Herman Joseph is a 59 year old male who comes in from home with his wife having been seen a few hours ago with complex laceration of the left elbow from a chainsaw injury.  I reviewed the records of that visit.  I reviewed the CT imaging of the elbow.  The wound was repaired.  There was some bone fragments within the laceration.  He was not prescribed any pain medication and had not taken any.  He started to develop significant pain and took 4 ibuprofen about 90 minutes ago.  He was prescribed oxycodone through the [facility] machine came in to get it but had such significant pain he decided to be seen again.  He took 1 dose of 5 mg oxycodone about 20 minutes ago.  He is starting to get some pain release in the last 5 minutes.    Allergies:  No Known Allergies    Problem List:    Patient Active Problem List    Diagnosis Date Noted    Onychomycosis 02/01/2024     Priority: Medium    Eczema, unspecified type 02/01/2024     Priority: Medium    Tinea pedis of both feet 02/01/2024     Priority: Medium    Arthritis of knee 02/01/2024     Priority: Medium    Genital warts 01/21/2020     Priority: Medium    Actinic keratosis 01/21/2020     Priority: Medium    ADHD (attention deficit hyperactivity disorder), combined type - every 6 month med checks 01/21/2020     Priority: Medium     Patient is followed by [facility] for ongoing prescription of stimulants.  All refills should be approved by this provider, or covering partner.    Medication(s): Adderall 20 mg tablet.   Maximum quantity per month: 60  Clinic visit frequency required: Q 6  months     Controlled substance agreement on file: Yes       Date(s): 2/21/2020  Neuropsych evaluation for ADD completed:  Yes, completed , not on file    Last [facility] website verification:  done on 2/21/2020  [email or web address]          Class 1 obesity without serious comorbidity with body  mass index (BMI) of 30.0 to 30.9 in adult, unspecified obesity type 04/27/2018     Priority: Medium    Chronic sinusitis      Priority: Medium    ED (erectile dysfunction)      Priority: Medium    Contact dermatitis and other eczema, due to unspecified cause 09/11/2004     Priority: Medium    Esophageal reflux 06/20/2002     Priority: Medium        Past Medical History:    Past Medical History:   Diagnosis Date    CARDIOVASCULAR SCREENING; LDL GOAL LESS THAN 130     Cellulitis     Chronic sinusitis     ED (erectile dysfunction)     Esophageal reflux 6/20/2002    Kyphoscoliosis and scoliosis     Kyphoscoliosis and scoliosis     Major depressive disorder, single episode, moderate (H) 9/11/2004    Urethral stricture 6/13/2013    URETHRAL STRICTURE NEC 9/11/2004       Past Surgical History:    Past Surgical History:   Procedure Laterality Date    COLONOSCOPY N/A 2/11/2016    normal, due 10 yrs    EXAM UNDER ANESTHESIA, FULGURATE CONDYLOMA ANUS, COMBINED  12/18/2013    Procedure: COMBINED EXAM UNDER ANESTHESIA, FULGURATE CONDYLOMA ANUS;  EXAM UNDER ANESTHESIA, EXCISION/FULGURATION ANAL CONDYLOMA;  Surgeon: Naseem Partida MD;  Location: [facility]    FULGURATE CONDYLOMA RECTUM  8/21/2013    Procedure: FULGURATE CONDYLOMA RECTUM;  EXCISION AND FULGURATION OF ANAL CONDYLOMA;  Surgeon: Naseem Partida MD;  Location: [facility]    FULGURATE CONDYLOMA RECTUM N/A 2/11/2016    Procedure: FULGURATE CONDYLOMA RECTUM;  Surgeon: Naseem Partida MD;  Location: [facility]    HERNIA REPAIR Bilateral 2011    Bilateral inguinal       Family History:    Family History   Problem Relation Age of Onset    Hypothyroidism Mother     Throat cancer Father     Depression Sister     Anxiety Disorder Sister     Depression Sister     Anxiety Disorder Sister     Depression Sister     Anxiety Disorder Sister     Attention Deficit Disorder Sister     Depression Sister     Anxiety Disorder Sister     Alcohol/Drug Brother     Attention Deficit Disorder Brother      Breast Cancer Maternal Grandmother     Ovarian Cancer Maternal Grandmother     Coronary Artery Disease Maternal Grandmother     Coronary Artery Disease Paternal Grandfather     Heart Disease Paternal Grandfather         ??    Attention Deficit Disorder Daughter     No Known Problems Son     No Known Problems Son     Diabetes Maternal Uncle     Prostate Cancer No family hx of     Colon Cancer No family hx of     Hypertension No family hx of     Hyperlipidemia No family hx of     Cerebrovascular Disease No family hx of        Social History:  Marital Status:   [5]  Social History     Tobacco Use    Smoking status: Never    Smokeless tobacco: Never   Vaping Use    Vaping status: Never Used   Substance Use Topics    Alcohol use: Yes     Alcohol/week: 3.0 - 4.0 standard drinks of alcohol     Types: 3 - 4 Standard drinks or equivalent per week     Comment: 6 per month avg    Drug use: No        Medications:    oxyCODONE (ROXICODONE) 5 MG tablet  [START ON 10/27/2024] amphetamine-dextroamphetamine (ADDERALL XR) 20 MG 24 hr capsule  amphetamine-dextroamphetamine (ADDERALL XR) 20 MG 24 hr capsule  [START ON 11/26/2024] amphetamine-dextroamphetamine (ADDERALL XR) 20 MG 24 hr capsule  amphetamine-dextroamphetamine (ADDERALL) 10 MG tablet  betamethasone dipropionate (DIPROSONE) 0.05 % external cream  cephALEXin (KEFLEX) 500 MG capsule  cyclobenzaprine (FLEXERIL) 5 MG tablet  fexofenadine (ALLEGRA ALLERGY) 180 MG tablet  fluticasone (FLONASE) 50 MCG/ACT nasal spray  ondansetron (ZOFRAN ODT) 4 MG ODT tab  oxyCODONE (ROXICODONE) 5 MG tablet  sildenafil (VIAGRA) 100 MG tablet  terbinafine (LAMISIL) 250 MG tablet  valACYclovir (VALTREX) 500 MG tablet      Review of Systems    Further problem focused system review negative.    Physical Exam   BP: (!) 149/95  Temp: 97.4  F (36.3  C)  Resp: 18  SpO2: 99 %      Physical Exam    Nursing note and vitals were reviewed.  Constitutional: Sleepy but easily arousable, adequately  nourished and developed appearing 59-year-old in moderate discomfort, who does not appear acutely ill, and who answers questions appropriately and cooperates with examination.  HEENT: Speech is fluent.  Voice quality is normal.  EOMI.   Musculoskeletal: I remove the Ace wrap around the elbow but I did not remove the dressing.  There was some blood on the dressing but it was not saturated and not dripping.  I loosened the Ace wrap and reapplied it  Neurological: Alert, oriented, thought content logical, coherent   Psychiatric: Affect congruent.    ED Course        Procedures           Critical Care time:  none         Results for orders placed or performed during the hospital encounter of 10/21/24 (from the past 24 hour(s))   CT Elbow Left w/o Contrast    Narrative    EXAM: CT ELBOW LEFT W/O CONTRAST  LOCATION: [facility]  DATE: 10/21/2024    INDICATION: chainsaw injury down to bone  COMPARISON: None.  TECHNIQUE: Noncontrast. Axial, sagittal and coronal thin-section reconstruction. Dose reduction techniques were used.       Impression    FINDINGS/IMPRESSION:     1.  Soft tissue wound over the posterior elbow with edema and scattered foci of gas (series 4 image 52 for example).  2.  Scattered fracture fragments which are likely emanating from the olecranon process (series 4 image 45).   3.  Elbow joint spaces are preserved and normally aligned. No elbow joint effusion.  4.  Mild irregularity and hypodensity of the distal triceps which may reflect posttraumatic partial tearing (series 4 image 62).        -Laceration Repair    Narrative    Shakila Arevalo PA-C     10/21/2024  9:21 PM  [facility]    -Laceration Repair    Date/Time: 10/21/2024 9:19 PM    Performed by: Shakila Arevalo PA-C  Authorized by: Shakila Arevalo PA-C    Risks, benefits and alternatives discussed.      ANESTHESIA (see MAR for exact dosages):     Anesthesia method:  Local infiltration    Local  anesthetic:  Lidocaine 1% WITH epi  LACERATION DETAILS     Location:  Shoulder/arm    Shoulder/arm location:  L elbow    Length (cm):  5.5    REPAIR TYPE:     Repair type:  Simple    EXPLORATION:     Hemostasis achieved with:  Epinephrine    Wound exploration: wound explored through full range of motion and   entire depth of wound probed and visualized      TREATMENT:     Area cleansed with:  Saline    Amount of cleaning:  Extensive    Irrigation solution:  Sterile saline    Irrigation volume:  1500ml    Visualized foreign bodies/material removed: no      SKIN REPAIR     Repair method:  Sutures    Suture size:  3-0    Suture material:  Nylon    Suture technique:  Simple interrupted    Number of sutures:  4    APPROXIMATION     Approximation:  Loose    POST-PROCEDURE DETAILS     Dressing:  Antibiotic ointment, non-adherent dressing and bulky dressing      PROCEDURE    Patient Tolerance:  Patient tolerated the procedure well with no immediate   complications       Medications   acetaminophen (TYLENOL) tablet 1,000 mg (1,000 mg Oral $Given 10/22/24 0027)       Assessments & Plan (with Medical Decision Making)     59-year-old male presented with poorly controlled pain after complex left elbow laceration.  He had a thorough cleaning and closure and imaging evaluation and it is not surprising he has significant pain given bone fragments present within the wound suggesting that the chainsaw pieces of his bone.  He had taken essentially nothing for pain and when the local anesthetic wore off his pain became intractable.  It is now coming under good control after ibuprofen and a dose of oxycodone.  I reviewed with he and his wife appropriate pain management with acetaminophen, ibuprofen, oxycodone, rest and elevation as described in the discharge summary.  It would be far too soon to have any sort of infectious complication causing increased pain.  We discussed the expected clinical course.  He has been advised regarding  follow-up in orthopedics.  They expressed understanding and their questions were answered.    I have reviewed the nursing notes.    I have reviewed the findings, diagnosis, plan and need for follow up with the patient.         New Prescriptions    OXYCODONE (ROXICODONE) 5 MG TABLET    Take 1-2 tablets (5-10 mg) by mouth every 6 hours as needed.       Final diagnoses:   Elbow laceration, left, initial encounter       10/21/2024   [facility] EMERGENCY DEPT       Wisam Benedict MD  10/22/24 0034

## 2024-10-22 NOTE — DISCHARGE INSTRUCTIONS
Leave the bandage on until you follow-up with orthopedics.  Do not take the bandage off or get it wet. If the bandage gets accidentally soiled/dirty you should take it off and gently wash the wound with soap and water. Re-bandage with non-stick gauze and elastic/ace bandage.     Wear the sling during the day with activities to prevent lifting or too much movement of the elbow.    Take Keflex 3 times a day to prevent/treat infection.    For pain you can take ibuprofen 600 mg and/or Tylenol 500-1000 mg every 6 hours as needed.  I also recommend keeping the arm elevated/propped up on a pillow while resting to reduce swelling and pain.    You should receive a phone call tomorrow to schedule a follow-up appointment with the [facility] orthopedics clinic this week.    Return to the ER if you develop severely worsening pain, swelling, redness, numbness, purple cold fingers, fever, or if other concerning symptoms develop.

## 2024-10-22 NOTE — ED PROVIDER NOTES
History     Chief Complaint   Patient presents with    Laceration     Cut arm with chain saw       Herman Joseph is a 59 year old male with no significant pmhx who presents for evaluation of chainsaw injury.  Patient states he was using his arms to push against a tree while a friend was using a chainsaw to cut it.  The chainsaw went through the tree and the friend accidentally hit his left arm with the chainsaw inflicting a wound near the elbow.  Patient denies numbness or tingling in the forearm or hand.  He denies any difficulty extending or flexing the elbow or flexing or extending the wrist or fingers.  He is right-hand dominant.    Allergies:  No Known Allergies    Problem List:    Patient Active Problem List    Diagnosis Date Noted    Onychomycosis 02/01/2024     Priority: Medium    Eczema, unspecified type 02/01/2024     Priority: Medium    Tinea pedis of both feet 02/01/2024     Priority: Medium    Arthritis of knee 02/01/2024     Priority: Medium    Genital warts 01/21/2020     Priority: Medium    Actinic keratosis 01/21/2020     Priority: Medium    ADHD (attention deficit hyperactivity disorder), combined type - every 6 month med checks 01/21/2020     Priority: Medium     Patient is followed by [facility] for ongoing prescription of stimulants.  All refills should be approved by this provider, or covering partner.    Medication(s): Adderall 20 mg tablet.   Maximum quantity per month: 60  Clinic visit frequency required: Q 6  months     Controlled substance agreement on file: Yes       Date(s): 2/21/2020  Neuropsych evaluation for ADD completed:  Yes, completed , not on file    Last [facility] website verification:  done on 2/21/2020  [email or web address]          Class 1 obesity without serious comorbidity with body mass index (BMI) of 30.0 to 30.9 in adult, unspecified obesity type 04/27/2018     Priority: Medium    Chronic sinusitis      Priority: Medium    ED (erectile dysfunction)       Priority: Medium    Contact dermatitis and other eczema, due to unspecified cause 09/11/2004     Priority: Medium    Esophageal reflux 06/20/2002     Priority: Medium        Past Medical History:    Past Medical History:   Diagnosis Date    CARDIOVASCULAR SCREENING; LDL GOAL LESS THAN 130     Cellulitis     Chronic sinusitis     ED (erectile dysfunction)     Esophageal reflux 6/20/2002    Kyphoscoliosis and scoliosis     Kyphoscoliosis and scoliosis     Major depressive disorder, single episode, moderate (H) 9/11/2004    Urethral stricture 6/13/2013    URETHRAL STRICTURE NEC 9/11/2004       Past Surgical History:    Past Surgical History:   Procedure Laterality Date    COLONOSCOPY N/A 2/11/2016    normal, due 10 yrs    EXAM UNDER ANESTHESIA, FULGURATE CONDYLOMA ANUS, COMBINED  12/18/2013    Procedure: COMBINED EXAM UNDER ANESTHESIA, FULGURATE CONDYLOMA ANUS;  EXAM UNDER ANESTHESIA, EXCISION/FULGURATION ANAL CONDYLOMA;  Surgeon: Naseem Partida MD;  Location: [facility]    FULGURATE CONDYLOMA RECTUM  8/21/2013    Procedure: FULGURATE CONDYLOMA RECTUM;  EXCISION AND FULGURATION OF ANAL CONDYLOMA;  Surgeon: Naseem Partida MD;  Location: [facility]    FULGURATE CONDYLOMA RECTUM N/A 2/11/2016    Procedure: FULGURATE CONDYLOMA RECTUM;  Surgeon: Naseem Partida MD;  Location: [facility]    HERNIA REPAIR Bilateral 2011    Bilateral inguinal       Family History:    Family History   Problem Relation Age of Onset    Hypothyroidism Mother     Throat cancer Father     Depression Sister     Anxiety Disorder Sister     Depression Sister     Anxiety Disorder Sister     Depression Sister     Anxiety Disorder Sister     Attention Deficit Disorder Sister     Depression Sister     Anxiety Disorder Sister     Alcohol/Drug Brother     Attention Deficit Disorder Brother     Breast Cancer Maternal Grandmother     Ovarian Cancer Maternal Grandmother     Coronary Artery Disease Maternal Grandmother     Coronary Artery Disease Paternal Grandfather   "   Heart Disease Paternal Grandfather         ??    Attention Deficit Disorder Daughter     No Known Problems Son     No Known Problems Son     Diabetes Maternal Uncle     Prostate Cancer No family hx of     Colon Cancer No family hx of     Hypertension No family hx of     Hyperlipidemia No family hx of     Cerebrovascular Disease No family hx of        Social History:  Marital Status:  Single [1]  Social History     Tobacco Use    Smoking status: Never    Smokeless tobacco: Never   Vaping Use    Vaping status: Never Used   Substance Use Topics    Alcohol use: Yes     Alcohol/week: 3.0 - 4.0 standard drinks of alcohol     Types: 3 - 4 Standard drinks or equivalent per week     Comment: 6 per month avg    Drug use: No        Medications:    cephALEXin (KEFLEX) 500 MG capsule  [START ON 10/27/2024] amphetamine-dextroamphetamine (ADDERALL XR) 20 MG 24 hr capsule  amphetamine-dextroamphetamine (ADDERALL XR) 20 MG 24 hr capsule  [START ON 11/26/2024] amphetamine-dextroamphetamine (ADDERALL XR) 20 MG 24 hr capsule  amphetamine-dextroamphetamine (ADDERALL) 10 MG tablet  betamethasone dipropionate (DIPROSONE) 0.05 % external cream  cyclobenzaprine (FLEXERIL) 5 MG tablet  fexofenadine (ALLEGRA ALLERGY) 180 MG tablet  fluticasone (FLONASE) 50 MCG/ACT nasal spray  sildenafil (VIAGRA) 100 MG tablet  terbinafine (LAMISIL) 250 MG tablet  valACYclovir (VALTREX) 500 MG tablet          Physical Exam   BP: (!) 162/90  Pulse: 115  Temp: 97.1  F (36.2  C)  Resp: 20  Height: 99.1 cm (3' 3\")  Weight: 113.4 kg (250 lb)  SpO2: 96 %      Physical Exam  Vitals and nursing note reviewed.   Constitutional:       General: He is not in acute distress.     Appearance: He is not ill-appearing, toxic-appearing or diaphoretic.   HENT:      Head: Normocephalic and atraumatic.   Eyes:      Extraocular Movements: Extraocular movements intact.      Pupils: Pupils are equal, round, and reactive to light.   Cardiovascular:      Rate and Rhythm: Normal " rate and regular rhythm.      Pulses: Normal pulses.   Pulmonary:      Effort: Pulmonary effort is normal.   Musculoskeletal:         General: Normal range of motion.      Cervical back: Normal range of motion.      Comments: Large irregular laceration over the distal elbow/proximal forearm.  It probes down to bone.  Patient has full flexion and extension of the elbow, wrist, and digits.  Radial pulse 2+, cap refill less than 2 seconds.  No reported sensation deficits.   Skin:     General: Skin is warm.      Capillary Refill: Capillary refill takes less than 2 seconds.   Neurological:      General: No focal deficit present.      Mental Status: He is alert and oriented to person, place, and time.      Sensory: No sensory deficit.      Motor: No weakness.   Psychiatric:         Mood and Affect: Mood normal.         Behavior: Behavior normal.         ED [facility]    -Laceration Repair    Date/Time: 10/21/2024 9:19 PM    Performed by: Shakila Arevalo PA-C  Authorized by: Shakila Arevalo PA-C    Risks, benefits and alternatives discussed.      ANESTHESIA (see MAR for exact dosages):     Anesthesia method:  Local infiltration    Local anesthetic:  Lidocaine 1% WITH epi  LACERATION DETAILS     Location:  Shoulder/arm    Shoulder/arm location:  L elbow    Length (cm):  5.5    REPAIR TYPE:     Repair type:  Simple    EXPLORATION:     Hemostasis achieved with:  Epinephrine    Wound exploration: wound explored through full range of motion and entire depth of wound probed and visualized      TREATMENT:     Area cleansed with:  Saline    Amount of cleaning:  Extensive    Irrigation solution:  Sterile saline    Irrigation volume:  1500ml    Visualized foreign bodies/material removed: no      SKIN REPAIR     Repair method:  Sutures    Suture size:  3-0    Suture material:  Nylon    Suture technique:  Simple interrupted    Number of sutures:  4    APPROXIMATION     Approximation:   Loose    POST-PROCEDURE DETAILS     Dressing:  Antibiotic ointment, non-adherent dressing and bulky dressing      PROCEDURE    Patient Tolerance:  Patient tolerated the procedure well with no immediate complications                    Results for orders placed or performed during the hospital encounter of 10/21/24 (from the past 24 hour(s))   CT Elbow Left w/o Contrast    Narrative    EXAM: CT ELBOW LEFT W/O CONTRAST  LOCATION: [facility]  DATE: 10/21/2024    INDICATION: chainsaw injury down to bone  COMPARISON: None.  TECHNIQUE: Noncontrast. Axial, sagittal and coronal thin-section reconstruction. Dose reduction techniques were used.       Impression    FINDINGS/IMPRESSION:     1.  Soft tissue wound over the posterior elbow with edema and scattered foci of gas (series 4 image 52 for example).  2.  Scattered fracture fragments which are likely emanating from the olecranon process (series 4 image 45).   3.  Elbow joint spaces are preserved and normally aligned. No elbow joint effusion.  4.  Mild irregularity and hypodensity of the distal triceps which may reflect posttraumatic partial tearing (series 4 image 62).            Medications   ceFAZolin (ANCEF) 1 g vial to attach to  ml bag for ADULT or 50 ml bag for PEDS (0 g Intravenous Stopped 10/21/24 2114)       Assessments & Plan (with Medical Decision Making)     I have reviewed the nursing notes.    I have reviewed the findings, diagnosis, plan and need for follow up with the patient.    Medical Decision Making  Herman Joseph is a 59 year old male with no significant pmhx who presents for evaluation of elbow laceration.  Differential diagnoses include muscle/tendon injury, neurovascular injury, bony injury.  Vital signs with hypertension at 162/90 and mild tachycardia at 115 which I suspect is due to acute pain and anxiety.  He is satting 96% on room air with a regular respiratory rate and effort.    On examination patient has  anxious appearing, but no acute distress.  He has a large irregular laceration over the distal elbow/proximal forearm.  He has full flexion and extension of the left elbow.  He also has intact flexion and extension of the left wrist and all 5 digits.  Strength is 5/5.  Cap refill less than 2 seconds, radial pulse 2+.  Low suspicion for neurovascular injury.  Due to the complexity of the laceration with probing to bone, Dr. Vera was staffed and evaluated the patient.  CT was obtained to evaluate for bony or joint involvement.  It revealed small bony fragments that appear to be coming from the olecranon as well as possible triceps tendon partial tear.  No evidence of elbow joint involvement.  Per chart review, patient's Tdap is up-to-date and was last administered in 2020.    Orthopedics was consulted.  Dr. Royce Montalvo with [facility] recommended thorough wash out, loose closure, outpatient abx, and close follow up in clinic.  Patient received single dose of IV Ancef in the emergency department.  His wound was thoroughly cleansed with 1.5 L saline.  It was loosely closed with 5 sutures.  Dressing was placed with antibiotic ointment, Adaptic, gauze, and Ace bandage.  Patient was provided with a sling to wear with activities to prevent overuse of the elbow.  Was instructed to leave the bandage on until he follows up with orthopedics.  He was instructed to start taking Keflex 3 times a day for antibiotic prophylaxis.  We discussed strict return precautions should he develop signs or symptoms of neurovascular injury or infection.  Patient was instructed he would receive a phone call within the next 1-2 days to schedule close follow-up with the orthopedics clinic this week.  Patient voiced understanding of the plan and had no further questions.    New Prescriptions    CEPHALEXIN (KEFLEX) 500 MG CAPSULE    Take 1 capsule (500 mg) by mouth 3 times daily for 5 days.       Final diagnoses:   Elbow laceration, left, initial  encounter   Contact with chainsaw as cause of accidental injury       Shakila Arevalo PA-C  6/8/2024   [facility] EMERGENCY DEPT     Shakila Arevalo PA-C  10/21/24 2121

## 2024-10-22 NOTE — ED NOTES
Pt was seen earlier tonight for a laceration to the left elbow after chainsaw accident, presents with increase pain, states he just took 2 of his oxycodone, CMS intact.

## 2024-10-22 NOTE — ED TRIAGE NOTES
Left elbow pain continued since being seen for a chainsaw accident earlier. Has not taken an PO medications since discharge     Triage Assessment (Adult)       Row Name 10/21/24 2402          Triage Assessment    Airway WDL WDL        Respiratory WDL    Respiratory WDL WDL        Skin Circulation/Temperature WDL    Skin Circulation/Temperature WDL WDL        Cardiac WDL    Cardiac WDL WDL        Peripheral/Neurovascular WDL    Peripheral Neurovascular WDL WDL

## 2024-10-22 NOTE — ED PROVIDER NOTES
Provided a prescription for  pain management after ED course of care for complicated left elbow laceration sustained from a chainsaw while cutting down trees.  Spouse had called and spoke with the ED charge RN requesting pain medications.  Patient had imaging and wound care including laceration repair and was discharged with plan for supportive care with empiric antimicrobials with plan for close follow-up with orthopedic surgery clinic.  After arriving home patient's pain increased after his local anesthesia provided for wound care and laceration repair wore off.   Patient was given a prescription for oxycodone  5 mg to use every 6-8 hours x 6 tablets in addition to over-the-counter medication such as Tylenol 1000 mg every 8 hours and Motrin ibuprofen with food 400 mg every 12 hours.  He was encouraged to apply ice or heat to the area of his wound and injury and to keep the wound clean and dry.    Patient was not seen or examined after initial ED discharge.       Dayne Vera MD  10/21/24 6789

## 2024-10-22 NOTE — DISCHARGE INSTRUCTIONS
Rest your elbow is much as possible for the next 48 hours.  Minimize use.  Overuse will increase the pain.  You should take ibuprofen, 400 mg, 4 times per day if needed for pain.  You may add acetaminophen 1000 mg 4 times per day if needed for pain.    You may add oxycodone 5 mg, 1-2 tablets up to every 6 hours if needed for pain.  Try to use this primarily only at night to help with sleep.    Do not use alcohol, operate machinery, drive, or climb on ladders, or perform other complex motor activity or make important decisions for 8 hours after taking oxycodone. Use docusate (100mg) 2 times a day to prevent constipation while on narcotics.

## 2024-10-24 ENCOUNTER — OFFICE VISIT (OUTPATIENT)
Dept: ORTHOPEDICS | Facility: CLINIC | Age: 59
End: 2024-10-24
Payer: COMMERCIAL

## 2024-10-24 ENCOUNTER — HOSPITAL ENCOUNTER (OUTPATIENT)
Facility: AMBULATORY SURGERY CENTER | Age: 59
End: 2024-10-24
Attending: STUDENT IN AN ORGANIZED HEALTH CARE EDUCATION/TRAINING PROGRAM | Admitting: STUDENT IN AN ORGANIZED HEALTH CARE EDUCATION/TRAINING PROGRAM
Payer: COMMERCIAL

## 2024-10-24 VITALS — BODY MASS INDEX: 31.08 KG/M2 | HEIGHT: 75 IN | WEIGHT: 250 LBS

## 2024-10-24 DIAGNOSIS — S51.012A ELBOW LACERATION, LEFT, INITIAL ENCOUNTER: ICD-10-CM
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
## 2024-10-24 PROCEDURE — 99204 OFFICE O/P NEW MOD 45 MIN: CPT | Performed by: STUDENT IN AN ORGANIZED HEALTH CARE EDUCATION/TRAINING PROGRAM

## 2024-10-24 RX ORDER — CEPHALEXIN 500 MG/1
500 CAPSULE ORAL 3 TIMES DAILY
Qty: 15 CAPSULE | Refills: 0 | Status: SHIPPED | OUTPATIENT
Start: 2024-10-24

## 2024-10-24 NOTE — LETTER
10/24/2024      Herman Joseph  [address] [place] [address]      Dear Colleague,    Thank you for referring your patient, Herman Joseph, to the [facility]. Please see a copy of my visit note below.    CC: Left Elbow Laceration    HPI: Patient is a 59-year-old male seen here today for left elbow laceration and bony injury.  This occurred on Monday, October 21.  He is cutting down a tree with a chainsaw when the chainsaw struck his left elbow just distal to the tip of the olecranon.  He was seen at outside hospital.  CT at that time showed breach of the dorsal cortex of the olecranon without fracture line extending through the olecranon.  He reports that he was given a dose of IV Ancef in the ER.  His tetanus is up-to-date.  His incision was irrigated out with a few syringes of normal saline and loosely approximated by the ER PA.  This was staffed with and surgeon outside group.  He ultimately elected to follow-up here as this was closer to where he works.  Has been taking oral Keflex since his discharge.  Denies any numbness or tingling in his hand.  He is regained most full range of motion.  He has kept his elbow wrapped in an Ace wrap.         Patient Active Problem List   Diagnosis     Contact dermatitis and other eczema, due to unspecified cause     Esophageal reflux     Chronic sinusitis     ED (erectile dysfunction)     Class 1 obesity without serious comorbidity with body mass index (BMI) of 30.0 to 30.9 in adult, unspecified obesity type     Genital warts     Actinic keratosis     ADHD (attention deficit hyperactivity disorder), combined type - every 6 month med checks     Onychomycosis     Eczema, unspecified type     Tinea pedis of both feet     Arthritis of knee     Elbow laceration, left, initial encounter          Past Medical History:   Diagnosis Date     CARDIOVASCULAR SCREENING; LDL GOAL LESS THAN 130      Cellulitis      Chronic sinusitis      ED (erectile  dysfunction)      Esophageal reflux 6/20/2002     Kyphoscoliosis and scoliosis      Kyphoscoliosis and scoliosis      Major depressive disorder, single episode, moderate (H) 9/11/2004     Urethral stricture 6/13/2013     URETHRAL STRICTURE NEC 9/11/2004          Past Surgical History:   Procedure Laterality Date     COLONOSCOPY N/A 2/11/2016    normal, due 10 yrs     EXAM UNDER ANESTHESIA, FULGURATE CONDYLOMA ANUS, COMBINED  12/18/2013    Procedure: COMBINED EXAM UNDER ANESTHESIA, FULGURATE CONDYLOMA ANUS;  EXAM UNDER ANESTHESIA, EXCISION/FULGURATION ANAL CONDYLOMA;  Surgeon: Naseem Partida MD;  Location: [facility]     FULGURATE CONDYLOMA RECTUM  8/21/2013    Procedure: FULGURATE CONDYLOMA RECTUM;  EXCISION AND FULGURATION OF ANAL CONDYLOMA;  Surgeon: Naseem Partida MD;  Location: [facility]     FULGURATE CONDYLOMA RECTUM N/A 2/11/2016    Procedure: FULGURATE CONDYLOMA RECTUM;  Surgeon: Naseem Partida MD;  Location: [facility]     HERNIA REPAIR Bilateral 2011    Bilateral inguinal          Current Outpatient Medications   Medication Sig Dispense Refill     cephALEXin (KEFLEX) 500 MG capsule Take 1 capsule (500 mg) by mouth 3 times daily. 15 capsule 0     [START ON 10/27/2024] amphetamine-dextroamphetamine (ADDERALL XR) 20 MG 24 hr capsule Take 1 capsule (20 mg) by mouth daily. 30 capsule 0     amphetamine-dextroamphetamine (ADDERALL XR) 20 MG 24 hr capsule Take 1 capsule (20 mg) by mouth daily. Do not start before September 27, 2024. 30 capsule 0     [START ON 11/26/2024] amphetamine-dextroamphetamine (ADDERALL XR) 20 MG 24 hr capsule Take 1 capsule (20 mg) by mouth daily. Do not start before November 26, 2024. 30 capsule 0     amphetamine-dextroamphetamine (ADDERALL) 10 MG tablet Take 1 tablet (10 mg) by mouth daily as needed. 30 tablet 0     betamethasone dipropionate (DIPROSONE) 0.05 % external cream Apply topically 2 times daily. 45 g 2     cyclobenzaprine (FLEXERIL) 5 MG tablet Take 1-2 tablets (5-10 mg) by mouth 3  times daily as needed for muscle spasms 25 tablet 1     fexofenadine (ALLEGRA ALLERGY) 180 MG tablet Take 1 tablet (180 mg) by mouth daily 90 tablet 3     fluticasone (FLONASE) 50 MCG/ACT nasal spray Spray 1-2 sprays into both nostrils daily 16 g 11     ondansetron (ZOFRAN ODT) 4 MG ODT tab Take 1 tablet (4 mg) by mouth every 8 hours as needed for nausea. 10 tablet 0     oxyCODONE (ROXICODONE) 5 MG tablet Take 1-2 tablets (5-10 mg) by mouth every 6 hours as needed. 12 tablet 0     sildenafil (VIAGRA) 100 MG tablet Take 1 tablet (100 mg) by mouth daily as needed (Erectile dysfunction). 30 tablet 5     terbinafine (LAMISIL) 250 MG tablet Take 1 tablet (250 mg) by mouth daily. x 30days then hold 1 month then 30days 60 tablet 0     valACYclovir (VALTREX) 500 MG tablet Take 1 tablet (500 mg) by mouth daily. 90 tablet 4        No Known Allergies       Family History   Problem Relation Age of Onset     Hypothyroidism Mother      Throat cancer Father      Depression Sister      Anxiety Disorder Sister      Depression Sister      Anxiety Disorder Sister      Depression Sister      Anxiety Disorder Sister      Attention Deficit Disorder Sister      Depression Sister      Anxiety Disorder Sister      Alcohol/Drug Brother      Attention Deficit Disorder Brother      Breast Cancer Maternal Grandmother      Ovarian Cancer Maternal Grandmother      Coronary Artery Disease Maternal Grandmother      Coronary Artery Disease Paternal Grandfather      Heart Disease Paternal Grandfather         ??     Attention Deficit Disorder Daughter      No Known Problems Son      No Known Problems Son      Diabetes Maternal Uncle      Prostate Cancer No family hx of      Colon Cancer No family hx of      Hypertension No family hx of      Hyperlipidemia No family hx of      Cerebrovascular Disease No family hx of           Social History     Tobacco Use     Smoking status: Never     Smokeless tobacco: Never   Substance Use Topics     Alcohol use:  Yes     Alcohol/week: 3.0 - 4.0 standard drinks of alcohol     Types: 3 - 4 Standard drinks or equivalent per week     Comment: 6 per month avg            Objective:  Physical Exam:  LUE: Free and painless range of motion of the shoulder.  There is a 4 cm longitudinal incision centered approximately 2 cm distal to the tip of the olecranon.  It is loosely approximated with nylon suture.  Mild erythema.  No heath drainage.  Pain to palpation around the incision site.  Passive range of motion of the elbow is full extension to 120 degrees of flexion.  Active range of motion is: To passive range of motion.  5/5 strength in elbow flexion and extension.  90 degrees of pronation to 90 years of supination.  5/5 wrist flexion extension.  Sensation tact in radial, median, and ulnar nerve distribution in the hand.  2+ radial pulse.    Imaging:  CT scan of the left elbow from October 21 was reviewed.  This shows soft tissue laceration just distal to the tip of the olecranon.  There is breach of the dorsal cortex olecranon.  No fracture line extending across the olecranon.  No air in the elbow joint.    Assessment and Plan: Patient is a 59-year-old male seen here today for traumatic laceration and olecranon bony injury from a chainsaw.  This occurred 3 days prior.  He was seen at a local ER and bedside irrigation and loose closure was performed.  He was given IV Ancef and sent on p.o. Keflex.  This was staffed by an outside orthopedic group.  He ultimately came to my clinic.  At this time I discussed them that given traumatic bony injury with a dirty chainsaw, he is certainly at high risk of developing a local infection or osteomyelitis without thorough irrigation debridement.  I cannot comment as to the level of irrigation debridement performed in the ER.  Discussed with him that we typically recommend operative irrigation debridement in the OR to decrease his risk of deep-seated infection.  His alternative is to continue oral  antibiotics and monitor the incision for signs of infection.  I discussed operative intervention in the form of left elbow I&D with skin closure.  I described the operative technique.  I discussed the risk and benefits of operativ intervention clued but limited to bleeding, failure to prevent infection, demonstrating nerves and blood vessels, loss of limb and loss of life.  I definitely answer his questions.  He ultimately agreed to move forward with left elbow incision debridements.  We will get him scheduled for this at soonest availability.  I will extend his p.o. Keflex until that time.      Jr Cornell MD    [facility]   Department of Orthopedic Surgery    Disclaimer: This note consists of symbols derived from keyboarding, dictation and/or voice recognition software. As a result, there may be errors in the script that have gone undetected. Please consider this when interpreting information found in this chart.       Again, thank you for allowing me to participate in the care of your patient.        Sincerely,        Jr Cornell MD   Poor Soft. TTP mostly lower abd.

## 2024-10-24 NOTE — PATIENT INSTRUCTIONS
[facility]   [address], [address]  [place] [address] [address] [place] [address]   Appointments: [phone] Appointments: [phone]   Fax: [phone] Fax: [phone]       1. Elbow laceration, left, initial encounter    2. Contact with chainsaw as cause of accidental injury        SURGERY:  Schedule  surgery.   The Surgery Scheduler will contact you to assist with scheduling surgery.   You can contact her directly at [phone].       A pre-operative Physical with your primary care physician is required within 30 days of your scheduled proceedure          FORMS:   If you are needing any forms completed relating to your upcoming procedure, please send them to our office with a completed Release of Information.   Forms will be completed AFTER your procedure. A letter can be sent to your employer prior to surgery to inform them of your anticipated time off.    Please notify our staff if you would like a letter to do so.   Forms can be faxed directly to our clinic at [phone].     DO NOT BRING FORMS ON THE DATE OF SURGERY.         Call my office with any questions or concerns, [phone].

## 2024-10-25 PROBLEM — S51.012A ELBOW LACERATION, LEFT, INITIAL ENCOUNTER: Status: ACTIVE | Noted: 2024-10-24

## 2024-10-25 NOTE — PROGRESS NOTES
CC: Left Elbow Laceration    HPI: Patient is a 59-year-old male seen here today for left elbow laceration and bony injury.  This occurred on Monday, October 21.  He is cutting down a tree with a chainsaw when the chainsaw struck his left elbow just distal to the tip of the olecranon.  He was seen at outside hospital.  CT at that time showed breach of the dorsal cortex of the olecranon without fracture line extending through the olecranon.  He reports that he was given a dose of IV Ancef in the ER.  His tetanus is up-to-date.  His incision was irrigated out with a few syringes of normal saline and loosely approximated by the ER PA.  This was staffed with and surgeon outside group.  He ultimately elected to follow-up here as this was closer to where he works.  Has been taking oral Keflex since his discharge.  Denies any numbness or tingling in his hand.  He is regained most full range of motion.  He has kept his elbow wrapped in an Ace wrap.         Patient Active Problem List   Diagnosis    Contact dermatitis and other eczema, due to unspecified cause    Esophageal reflux    Chronic sinusitis    ED (erectile dysfunction)    Class 1 obesity without serious comorbidity with body mass index (BMI) of 30.0 to 30.9 in adult, unspecified obesity type    Genital warts    Actinic keratosis    ADHD (attention deficit hyperactivity disorder), combined type - every 6 month med checks    Onychomycosis    Eczema, unspecified type    Tinea pedis of both feet    Arthritis of knee    Elbow laceration, left, initial encounter          Past Medical History:   Diagnosis Date    CARDIOVASCULAR SCREENING; LDL GOAL LESS THAN 130     Cellulitis     Chronic sinusitis     ED (erectile dysfunction)     Esophageal reflux 6/20/2002    Kyphoscoliosis and scoliosis     Kyphoscoliosis and scoliosis     Major depressive disorder, single episode, moderate (H) 9/11/2004    Urethral stricture 6/13/2013    URETHRAL STRICTURE NEC 9/11/2004          Past  Surgical History:   Procedure Laterality Date    COLONOSCOPY N/A 2/11/2016    normal, due 10 yrs    EXAM UNDER ANESTHESIA, FULGURATE CONDYLOMA ANUS, COMBINED  12/18/2013    Procedure: COMBINED EXAM UNDER ANESTHESIA, FULGURATE CONDYLOMA ANUS;  EXAM UNDER ANESTHESIA, EXCISION/FULGURATION ANAL CONDYLOMA;  Surgeon: Naseem Partida MD;  Location: [facility]    FULGURATE CONDYLOMA RECTUM  8/21/2013    Procedure: FULGURATE CONDYLOMA RECTUM;  EXCISION AND FULGURATION OF ANAL CONDYLOMA;  Surgeon: Naseem Partida MD;  Location: [facility]    FULGURATE CONDYLOMA RECTUM N/A 2/11/2016    Procedure: FULGURATE CONDYLOMA RECTUM;  Surgeon: Naseem Partida MD;  Location: [facility]    HERNIA REPAIR Bilateral 2011    Bilateral inguinal          Current Outpatient Medications   Medication Sig Dispense Refill    cephALEXin (KEFLEX) 500 MG capsule Take 1 capsule (500 mg) by mouth 3 times daily. 15 capsule 0    [START ON 10/27/2024] amphetamine-dextroamphetamine (ADDERALL XR) 20 MG 24 hr capsule Take 1 capsule (20 mg) by mouth daily. 30 capsule 0    amphetamine-dextroamphetamine (ADDERALL XR) 20 MG 24 hr capsule Take 1 capsule (20 mg) by mouth daily. Do not start before September 27, 2024. 30 capsule 0    [START ON 11/26/2024] amphetamine-dextroamphetamine (ADDERALL XR) 20 MG 24 hr capsule Take 1 capsule (20 mg) by mouth daily. Do not start before November 26, 2024. 30 capsule 0    amphetamine-dextroamphetamine (ADDERALL) 10 MG tablet Take 1 tablet (10 mg) by mouth daily as needed. 30 tablet 0    betamethasone dipropionate (DIPROSONE) 0.05 % external cream Apply topically 2 times daily. 45 g 2    cyclobenzaprine (FLEXERIL) 5 MG tablet Take 1-2 tablets (5-10 mg) by mouth 3 times daily as needed for muscle spasms 25 tablet 1    fexofenadine (ALLEGRA ALLERGY) 180 MG tablet Take 1 tablet (180 mg) by mouth daily 90 tablet 3    fluticasone (FLONASE) 50 MCG/ACT nasal spray Spray 1-2 sprays into both nostrils daily 16 g 11    ondansetron (ZOFRAN ODT) 4 MG  ODT tab Take 1 tablet (4 mg) by mouth every 8 hours as needed for nausea. 10 tablet 0    oxyCODONE (ROXICODONE) 5 MG tablet Take 1-2 tablets (5-10 mg) by mouth every 6 hours as needed. 12 tablet 0    sildenafil (VIAGRA) 100 MG tablet Take 1 tablet (100 mg) by mouth daily as needed (Erectile dysfunction). 30 tablet 5    terbinafine (LAMISIL) 250 MG tablet Take 1 tablet (250 mg) by mouth daily. x 30days then hold 1 month then 30days 60 tablet 0    valACYclovir (VALTREX) 500 MG tablet Take 1 tablet (500 mg) by mouth daily. 90 tablet 4        No Known Allergies       Family History   Problem Relation Age of Onset    Hypothyroidism Mother     Throat cancer Father     Depression Sister     Anxiety Disorder Sister     Depression Sister     Anxiety Disorder Sister     Depression Sister     Anxiety Disorder Sister     Attention Deficit Disorder Sister     Depression Sister     Anxiety Disorder Sister     Alcohol/Drug Brother     Attention Deficit Disorder Brother     Breast Cancer Maternal Grandmother     Ovarian Cancer Maternal Grandmother     Coronary Artery Disease Maternal Grandmother     Coronary Artery Disease Paternal Grandfather     Heart Disease Paternal Grandfather         ??    Attention Deficit Disorder Daughter     No Known Problems Son     No Known Problems Son     Diabetes Maternal Uncle     Prostate Cancer No family hx of     Colon Cancer No family hx of     Hypertension No family hx of     Hyperlipidemia No family hx of     Cerebrovascular Disease No family hx of           Social History     Tobacco Use    Smoking status: Never    Smokeless tobacco: Never   Substance Use Topics    Alcohol use: Yes     Alcohol/week: 3.0 - 4.0 standard drinks of alcohol     Types: 3 - 4 Standard drinks or equivalent per week     Comment: 6 per month avg            Objective:  Physical Exam:  LUE: Free and painless range of motion of the shoulder.  There is a 4 cm longitudinal incision centered approximately 2 cm distal to the  tip of the olecranon.  It is loosely approximated with nylon suture.  Mild erythema.  No heath drainage.  Pain to palpation around the incision site.  Passive range of motion of the elbow is full extension to 120 degrees of flexion.  Active range of motion is: To passive range of motion.  5/5 strength in elbow flexion and extension.  90 degrees of pronation to 90 years of supination.  5/5 wrist flexion extension.  Sensation tact in radial, median, and ulnar nerve distribution in the hand.  2+ radial pulse.    Imaging:  CT scan of the left elbow from October 21 was reviewed.  This shows soft tissue laceration just distal to the tip of the olecranon.  There is breach of the dorsal cortex olecranon.  No fracture line extending across the olecranon.  No air in the elbow joint.    Assessment and Plan: Patient is a 59-year-old male seen here today for traumatic laceration and olecranon bony injury from a chainsaw.  This occurred 3 days prior.  He was seen at a local ER and bedside irrigation and loose closure was performed.  He was given IV Ancef and sent on p.o. Keflex.  This was staffed by an outside orthopedic group.  He ultimately came to my clinic.  At this time I discussed them that given traumatic bony injury with a dirty chainsaw, he is certainly at high risk of developing a local infection or osteomyelitis without thorough irrigation debridement.  I cannot comment as to the level of irrigation debridement performed in the ER.  Discussed with him that we typically recommend operative irrigation debridement in the OR to decrease his risk of deep-seated infection.  His alternative is to continue oral antibiotics and monitor the incision for signs of infection.  I discussed operative intervention in the form of left elbow I&D with skin closure.  I described the operative technique.  I discussed the risk and benefits of operativ intervention clued but limited to bleeding, failure to prevent infection, demonstrating  nerves and blood vessels, loss of limb and loss of life.  I definitely answer his questions.  He ultimately agreed to move forward with left elbow incision debridements.  We will get him scheduled for this at soonest availability.  I will extend his p.o. Keflex until that time.      Jr Cornell MD    [facility]   Department of Orthopedic Surgery    Disclaimer: This note consists of symbols derived from keyboarding, dictation and/or voice recognition software. As a result, there may be errors in the script that have gone undetected. Please consider this when interpreting information found in this chart.

## 2024-11-14 DIAGNOSIS — F90.2 ADHD (ATTENTION DEFICIT HYPERACTIVITY DISORDER), COMBINED TYPE: ICD-10-CM

## 2024-11-14 DIAGNOSIS — B35.6 TINEA CRURIS: ICD-10-CM

## 2024-11-14 RX ORDER — DEXTROAMPHETAMINE SACCHARATE, AMPHETAMINE ASPARTATE, DEXTROAMPHETAMINE SULFATE AND AMPHETAMINE SULFATE 2.5; 2.5; 2.5; 2.5 MG/1; MG/1; MG/1; MG/1
10 TABLET ORAL DAILY PRN
Qty: 30 TABLET | Refills: 0 | Status: SHIPPED | OUTPATIENT
Start: 2024-11-14

## 2024-11-14 RX ORDER — TERBINAFINE HYDROCHLORIDE 250 MG/1
250 TABLET ORAL DAILY
Qty: 60 TABLET | Refills: 0 | Status: CANCELLED | OUTPATIENT
Start: 2024-11-14

## 2024-11-14 RX ORDER — DEXTROAMPHETAMINE SACCHARATE, AMPHETAMINE ASPARTATE MONOHYDRATE, DEXTROAMPHETAMINE SULFATE AND AMPHETAMINE SULFATE 5; 5; 5; 5 MG/1; MG/1; MG/1; MG/1
20 CAPSULE, EXTENDED RELEASE ORAL DAILY
Qty: 30 CAPSULE | Refills: 0 | Status: SHIPPED | OUTPATIENT
Start: 2024-11-15

## 2024-11-14 NOTE — TELEPHONE ENCOUNTER
Patient calls, is requesting refill of Lamisil tablets. Will pend order and forward to refill team.

## 2024-11-14 NOTE — TELEPHONE ENCOUNTER
Patient is calling to request Adderall 10mg refill and Adderall 20mg XR.     Reports he's out of Adderall 20mg rx. Writer advised 10/27/24 Adderall 20mg rx sent to [facility]    pt not sure if this was picked up, or if he was given a short script, just knows he's out      Writer placed call to [facility] spoke to Faisal.  Pharmacy reports pt DID NOT  10/27/24 Adderall 20mg XR, due to them being out of stock (they are still out of stock).    Pharmacy deleted Adderall 20mg XR prescription from system, due to pt wanting sent to [facility] in [place]    Please refill Adderall 10mg and send 10/27/24 Adderall 20mg XR to [facility] in [place]    Pt going out of town this weekend

## 2024-11-15 NOTE — TELEPHONE ENCOUNTER
Patient called and he did not  the prescription from September to take every other month.  He will pick that up now.  Current prescription does not need to be sent in.

## 2024-12-02 ENCOUNTER — TELEPHONE (OUTPATIENT)
Dept: ORTHOPEDICS | Facility: CLINIC | Age: 59
End: 2024-12-02
Payer: COMMERCIAL

## 2024-12-02 NOTE — TELEPHONE ENCOUNTER
Phone call to patient and offered an appointment with Dr. Warren 12/3/24 at 2 pm with 1:45 pm check in for his elbow laceration. He can see ANNY Huynh prior to the appointment or after. Recommended he come right at 1 pm to be seen prior, but made him aware it is a first come first serve schedule for the walk in. He verbalized understanding.     KATIUSKA Toussaint RN

## 2024-12-02 NOTE — TELEPHONE ENCOUNTER
Kraig recommends patient  follow up with [facility] who was consulted when seen in the ED on 10/21/24.     KATIUSKA Toussaint RN

## 2024-12-02 NOTE — TELEPHONE ENCOUNTER
"Per chart review, we have been trying to reach patient to schedule surgery for his elbow. His voicemail was full and he has not been checking [facility] messages.     Phone call to patient and he was informed of the above. He states he has had \"a lot going on.. 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 For the past 2 days he has notices swelling of the incision that feels like a lump and is swollen. Sutures are still intact from 10/21/24 laceration repair. There is redness around the incision and they are \"irritated\". Lump area is not painful. He denies drainage, fever, chills, or nausea.   He also fell on his right knee at the cabin yesterday. It is quite swollen and painful.   He is driving back from his cabin in [place] right now that is 1.5 hrs away. He wants to be seen today for both issues.     Will discuss with provider and get back with him. Ok to leave message. He states he has cleared out the voicemail.     KATIUSKA Toussaint RN    "

## 2024-12-02 NOTE — TELEPHONE ENCOUNTER
Phone call to patient and he was informed of recommendations below. Patient prefers to stay with [facility] if possible and not go to [facility]. He asks if he can see someone else. Will get back with him.     KATIUSKA Toussaint RN

## 2024-12-02 NOTE — TELEPHONE ENCOUNTER
Patient Returning Call    Reason for call:  patient is calling requesting callback from RN regarding a large mass underneath the skin that has developed and infection requesting callback     Information relayed to patient:  te sent to clinic     Patient has additional questions:  No      Could we send this information to you in MyChart or would you prefer to receive a phone call?:   Patient would prefer a phone call   Okay to leave a detailed message?: Yes at Cell number on file:    Telephone Information:   Mobile [phone]

## 2024-12-03 ENCOUNTER — ANCILLARY PROCEDURE (OUTPATIENT)
Dept: GENERAL RADIOLOGY | Facility: CLINIC | Age: 59
End: 2024-12-03
Attending: PHYSICIAN ASSISTANT
Payer: COMMERCIAL

## 2024-12-03 ENCOUNTER — OFFICE VISIT (OUTPATIENT)
Dept: ORTHOPEDICS | Facility: CLINIC | Age: 59
End: 2024-12-03
Payer: COMMERCIAL

## 2024-12-03 ENCOUNTER — TELEPHONE (OUTPATIENT)
Dept: ORTHOPEDICS | Facility: CLINIC | Age: 59
End: 2024-12-03

## 2024-12-03 VITALS — DIASTOLIC BLOOD PRESSURE: 82 MMHG | SYSTOLIC BLOOD PRESSURE: 148 MMHG

## 2024-12-03 DIAGNOSIS — M25.561 ACUTE PAIN OF RIGHT KNEE: Primary | ICD-10-CM

## 2024-12-03 DIAGNOSIS — M25.561 ACUTE PAIN OF RIGHT KNEE: ICD-10-CM

## 2024-12-03 PROCEDURE — 73560 X-RAY EXAM OF KNEE 1 OR 2: CPT | Mod: TC | Performed by: RADIOLOGY

## 2024-12-03 PROCEDURE — 99203 OFFICE O/P NEW LOW 30 MIN: CPT | Performed by: PHYSICIAN ASSISTANT

## 2024-12-03 NOTE — PROGRESS NOTES
ASSESSMENT & PLAN  Herman Joseph is seen today for his right knee pain.  Discussed that his imaging did not show any obvious fracture.  Most likely from his fall he has aggravated his underlying arthritis that is causing swelling in his knee.  We discussed management of this with conservative over-the-counter anti-inflammatories and Tylenol for pain.  He may use topical creams, ice, heat.  We will wrap his knee from toes to hip and he can continue to use his crutches as needed.  We discussed we can also consider an aspiration however we will hold off at this time.  Will schedule her follow-up in 1 week for clinical recheck and if he continues to have swallowing problems we can consider an aspiration at that time.  He was understanding this plan and agreement.  All questions answered.    Kristin Early PA-C  [facility]    -----  Chief Complaint   Patient presents with    Right Knee - Pain       SUBJECTIVE  Herman Joseph is a/an 59 year old male who is seen as a WALK IN patient for evaluation of right knee pain.  Onset was 12/1/24, he fell straight onto his knee Jareth night, fall was onto frozen ground. Pain is located right anterior knee, as well as laterally and medially. Symptoms are worsened by weightbearing, getting in/out of car, extension.  He has tried crutches, advil, ice. Associated symptoms include swelling, mild warmth, tightness, sharp and aching pain.    The patient is seen alone    Prior injury/Surgical history of affected joint: None, has had arthritis noted in this knee 2022.  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     REVIEW OF SYSTEMS:  Pertinent positives/negative: As stated above in HPI    OBJECTIVE:  There were no vitals taken for this visit.   General: Alert and in no distress  Skin: no visable rashes  CV: Extremities appear well perfused   Resp: normal respiratory effort, no conversational  dyspnea   Psych: normal mood, affect  MSK: RIGHT KNEE    Inspection:  There is no evidence of open wounds.   There is no evidence of erythema or infection  There is moderate swelling    Palpation:  There is no palpable fluctuance  There is no tenderness to palpation about the knee    Strength:  Extensor mechanism intact    Sensory:  Intact to light touch in the lower extremity bilaterally     Dorsalis pedis pulse is palpable and equal to contralateral side    Range of Motion:  Flexion: 90 degrees  Extension: 5 degrees    Examination Maneuvers:  Anterior drawer: -  Posterior drawer: -    Stable to varus and valgus stress at 0 and 30 degrees of flexion         RADIOLOGY:  Final results and radiologist's interpretation available in the [facility] health record.  Images below were personally reviewed and discussed with the patient in the office today.  My personal interpretation of the performed imaging: Right knee x-rays demonstrated degenerative changes without an acute fracture or dislocation.

## 2024-12-03 NOTE — LETTER
12/3/2024      Herman Joseph  [address] [place] [address]      Dear Colleague,    Thank you for referring your patient, Herman Joseph, to the [facility] [place]. Please see a copy of my visit note below.    ASSESSMENT & PLAN  Herman Joseph is seen today for his right knee pain.  Discussed that his imaging did not show any obvious fracture.  Most likely from his fall he has aggravated his underlying arthritis that is causing swelling in his knee.  We discussed management of this with conservative over-the-counter anti-inflammatories and Tylenol for pain.  He may use topical creams, ice, heat.  We will wrap his knee from toes to hip and he can continue to use his crutches as needed.  We discussed we can also consider an aspiration however we will hold off at this time.  Will schedule her follow-up in 1 week for clinical recheck and if he continues to have swallowing problems we can consider an aspiration at that time.  He was understanding this plan and agreement.  All questions answered.    Kristin Early PA-C  [facility]    -----  Chief Complaint   Patient presents with     Right Knee - Pain       SUBJECTIVE  Herman Joseph is a/an 59 year old male who is seen as a WALK IN patient for evaluation of right knee pain.  Onset was 12/1/24, he fell straight onto his knee Sunday night, fall was onto frozen ground. Pain is located right anterior knee, as well as laterally and medially. Symptoms are worsened by weightbearing, getting in/out of car, extension.  He has tried crutches, advil, ice. Associated symptoms include swelling, mild warmth, tightness, sharp and aching pain.    The patient is seen alone    Prior injury/Surgical history of affected joint: None, has had arthritis noted in this knee 2022.  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     REVIEW OF SYSTEMS:  Pertinent  positives/negative: As stated above in HPI    OBJECTIVE:  There were no vitals taken for this visit.   General: Alert and in no distress  Skin: no visable rashes  CV: Extremities appear well perfused   Resp: normal respiratory effort, no conversational dyspnea   Psych: normal mood, affect  MSK: RIGHT KNEE    Inspection:  There is no evidence of open wounds.   There is no evidence of erythema or infection  There is moderate swelling    Palpation:  There is no palpable fluctuance  There is no tenderness to palpation about the knee    Strength:  Extensor mechanism intact    Sensory:  Intact to light touch in the lower extremity bilaterally     Dorsalis pedis pulse is palpable and equal to contralateral side    Range of Motion:  Flexion: 90 degrees  Extension: 5 degrees    Examination Maneuvers:  Anterior drawer: -  Posterior drawer: -    Stable to varus and valgus stress at 0 and 30 degrees of flexion         RADIOLOGY:  Final results and radiologist's interpretation available in the [facility] health record.  Images below were personally reviewed and discussed with the patient in the office today.  My personal interpretation of the performed imaging: Right knee x-rays demonstrated degenerative changes without an acute fracture or dislocation.          Again, thank you for allowing me to participate in the care of your patient.        Sincerely,        Kristin Early PA-C

## 2024-12-03 NOTE — TELEPHONE ENCOUNTER
Patient showed up 45 minutes late to appointment arrival, 30 minutes late to appointment today.  Provider was not able to see patient due to tardiness.     Chart was reviewed with Dr. Warren and Dr. Cornell. Because of concerns of acute infection is recommended that patient present to the ED, the one he previously went to if possible.     Patient was previously non-compliant with recommendations from Dr. Cornell and arrived late to visit with Dr. Warren. If patient calls to schedule appointment with [facility] provider a message should be sent to the clinic before scheduling to determine appropriateness as previous recommendation was for patient to present to ED due to concern of infection.     Mayte Main, ATC

## 2024-12-03 NOTE — PATIENT INSTRUCTIONS
1. Acute pain of right knee        Recommendations  - OTC Tylenol 1000 mg and a NSAID (Ibuprofen 600 mg, Advil 600 mg) every 8 hours for pain.   -Topical creams such as Voltaren gel, Asperecreme, Biofreeze, china gel, Tiger Balm or blue emu.   - Apply ice first 72 hours post injury, then alternate heat and ice  -Compression wraps or braces as needed    Follow up with Sports medicine in 7 days if not improving, can consider aspiration at that time     -Call direct clinic number [[phone]] at any time with questions or concerns.    -Orthopedic Walk in Monday through Thursday 8 am to 6 pm, Friday 8 am to 5 pm, Saturday 8 am to 12 pm at [address] [place].

## 2024-12-18 DIAGNOSIS — F90.2 ADHD (ATTENTION DEFICIT HYPERACTIVITY DISORDER), COMBINED TYPE: ICD-10-CM

## 2024-12-19 RX ORDER — DEXTROAMPHETAMINE SACCHARATE, AMPHETAMINE ASPARTATE, DEXTROAMPHETAMINE SULFATE AND AMPHETAMINE SULFATE 2.5; 2.5; 2.5; 2.5 MG/1; MG/1; MG/1; MG/1
10 TABLET ORAL DAILY PRN
Qty: 30 TABLET | Refills: 0 | Status: SHIPPED | OUTPATIENT
Start: 2025-02-18

## 2024-12-19 RX ORDER — DEXTROAMPHETAMINE SACCHARATE, AMPHETAMINE ASPARTATE MONOHYDRATE, DEXTROAMPHETAMINE SULFATE AND AMPHETAMINE SULFATE 5; 5; 5; 5 MG/1; MG/1; MG/1; MG/1
20 CAPSULE, EXTENDED RELEASE ORAL DAILY
Qty: 30 CAPSULE | Refills: 0 | Status: SHIPPED | OUTPATIENT
Start: 2024-12-19 | End: 2025-01-18

## 2024-12-19 RX ORDER — DEXTROAMPHETAMINE SACCHARATE, AMPHETAMINE ASPARTATE MONOHYDRATE, DEXTROAMPHETAMINE SULFATE AND AMPHETAMINE SULFATE 5; 5; 5; 5 MG/1; MG/1; MG/1; MG/1
20 CAPSULE, EXTENDED RELEASE ORAL DAILY
Qty: 30 CAPSULE | Refills: 0 | Status: SHIPPED | OUTPATIENT
Start: 2025-01-18 | End: 2025-02-17

## 2024-12-19 RX ORDER — DEXTROAMPHETAMINE SACCHARATE, AMPHETAMINE ASPARTATE, DEXTROAMPHETAMINE SULFATE AND AMPHETAMINE SULFATE 2.5; 2.5; 2.5; 2.5 MG/1; MG/1; MG/1; MG/1
10 TABLET ORAL DAILY PRN
Qty: 30 TABLET | Refills: 0 | Status: SHIPPED | OUTPATIENT
Start: 2024-12-19 | End: 2025-01-18

## 2024-12-19 RX ORDER — DEXTROAMPHETAMINE SACCHARATE, AMPHETAMINE ASPARTATE, DEXTROAMPHETAMINE SULFATE AND AMPHETAMINE SULFATE 2.5; 2.5; 2.5; 2.5 MG/1; MG/1; MG/1; MG/1
10 TABLET ORAL DAILY PRN
Qty: 30 TABLET | Refills: 0 | Status: SHIPPED | OUTPATIENT
Start: 2025-01-18 | End: 2025-02-17

## 2024-12-19 RX ORDER — DEXTROAMPHETAMINE SACCHARATE, AMPHETAMINE ASPARTATE MONOHYDRATE, DEXTROAMPHETAMINE SULFATE AND AMPHETAMINE SULFATE 5; 5; 5; 5 MG/1; MG/1; MG/1; MG/1
20 CAPSULE, EXTENDED RELEASE ORAL DAILY
Qty: 30 CAPSULE | Refills: 0 | Status: SHIPPED | OUTPATIENT
Start: 2025-02-17

## 2025-03-07 ENCOUNTER — TELEPHONE (OUTPATIENT)
Dept: ORTHOPEDICS | Facility: CLINIC | Age: 60
End: 2025-03-07
Payer: COMMERCIAL

## 2025-03-07 NOTE — TELEPHONE ENCOUNTER
Attempted to call patient as follow up from previous discussion on 12/3/24 regarding left elbow.     Patient did not answer and voicemail box was full so unable to leave a message. Writer will call patient again next week to obtain an update on the status of his elbow and treatment plan.     Mayte Main, ATC

## 2025-03-13 NOTE — TELEPHONE ENCOUNTER
Spoke with patient as follow up from previous care with [facility]. Discussed that it was previously advised he go to the ED due to concern for infection and inquired if he was able to do so.     Patient states he missed an appointment and had difficulty getting in due to his job. He states that [facility] provider he saw at [place] was good and at that visit the provider said he was surprised it was not opened up at the ED.     Patient notes that since last discussion with our office in December he was seen at [facility] (~2 months ago). He notes things are improving.     Writer asked if there is anything our office can do to assist at this time. Patient declined but expressed appreciation for our call.     Mayte Main, ATC